# Patient Record
Sex: MALE | Race: OTHER | Employment: OTHER | URBAN - METROPOLITAN AREA
[De-identification: names, ages, dates, MRNs, and addresses within clinical notes are randomized per-mention and may not be internally consistent; named-entity substitution may affect disease eponyms.]

---

## 2017-02-09 ENCOUNTER — HOSPITAL ENCOUNTER (OUTPATIENT)
Dept: MRI IMAGING | Age: 52
Discharge: HOME | End: 2017-02-09
Attending: NEUROLOGICAL SURGERY
Payer: COMMERCIAL

## 2017-02-09 DIAGNOSIS — M54.50 LOWER BACK PAIN: ICD-10-CM

## 2017-02-09 PROCEDURE — 72148 MRI LUMBAR SPINE W/O DYE: CPT

## 2017-02-14 ENCOUNTER — OFFICE VISIT (OUTPATIENT)
Dept: INTERNAL MEDICINE CLINIC | Facility: CLINIC | Age: 52
End: 2017-02-14

## 2017-02-14 VITALS
HEIGHT: 69 IN | SYSTOLIC BLOOD PRESSURE: 145 MMHG | WEIGHT: 221 LBS | DIASTOLIC BLOOD PRESSURE: 95 MMHG | OXYGEN SATURATION: 96 % | TEMPERATURE: 98 F | HEART RATE: 87 BPM | BODY MASS INDEX: 32.73 KG/M2

## 2017-02-14 DIAGNOSIS — I10 ESSENTIAL HYPERTENSION: ICD-10-CM

## 2017-02-14 DIAGNOSIS — Z87.891 HISTORY OF SMOKING 10-25 PACK YEARS: ICD-10-CM

## 2017-02-14 DIAGNOSIS — R06.00 EXERTIONAL DYSPNEA: ICD-10-CM

## 2017-02-14 DIAGNOSIS — Z00.00 ANNUAL PHYSICAL EXAM: Primary | ICD-10-CM

## 2017-02-14 DIAGNOSIS — R42 DIZZY SPELLS: ICD-10-CM

## 2017-02-14 DIAGNOSIS — R00.2 PALPITATIONS: ICD-10-CM

## 2017-02-14 DIAGNOSIS — M54.42 CHRONIC BILATERAL LOW BACK PAIN WITH LEFT-SIDED SCIATICA: ICD-10-CM

## 2017-02-14 DIAGNOSIS — G89.29 CHRONIC BILATERAL LOW BACK PAIN WITH LEFT-SIDED SCIATICA: ICD-10-CM

## 2017-02-14 PROBLEM — R06.09 EXERTIONAL DYSPNEA: Status: ACTIVE | Noted: 2017-02-14

## 2017-02-14 LAB
ALBUMIN SERPL BCP-MCNC: 4.2 G/DL (ref 3.5–4.8)
ALBUMIN/GLOB SERPL: 1.3 {RATIO} (ref 1–2)
ALP SERPL-CCNC: 47 U/L (ref 32–100)
ALT SERPL-CCNC: 21 U/L (ref 17–63)
ANION GAP SERPL CALC-SCNC: 9 MMOL/L (ref 0–18)
AST SERPL-CCNC: 19 U/L (ref 15–41)
BASOPHILS # BLD: 0 K/UL (ref 0–0.2)
BASOPHILS NFR BLD: 1 %
BILIRUB SERPL-MCNC: 1 MG/DL (ref 0.3–1.2)
BILIRUB UR QL: NEGATIVE
BUN SERPL-MCNC: 9 MG/DL (ref 8–20)
BUN/CREAT SERPL: 12.2 (ref 10–20)
CALCIUM SERPL-MCNC: 9 MG/DL (ref 8.5–10.5)
CHLORIDE SERPL-SCNC: 104 MMOL/L (ref 95–110)
CHOLEST SERPL-MCNC: 197 MG/DL (ref 110–200)
CLARITY UR: CLEAR
CO2 SERPL-SCNC: 25 MMOL/L (ref 22–32)
COLOR UR: YELLOW
CREAT SERPL-MCNC: 0.74 MG/DL (ref 0.5–1.5)
EOSINOPHIL # BLD: 0.1 K/UL (ref 0–0.7)
EOSINOPHIL NFR BLD: 1 %
ERYTHROCYTE [DISTWIDTH] IN BLOOD BY AUTOMATED COUNT: 14 % (ref 11–15)
GLOBULIN PLAS-MCNC: 3.3 G/DL (ref 2.5–3.7)
GLUCOSE SERPL-MCNC: 91 MG/DL (ref 70–99)
GLUCOSE UR-MCNC: NEGATIVE MG/DL
HCT VFR BLD AUTO: 46.6 % (ref 41–52)
HDLC SERPL-MCNC: 37 MG/DL
HGB BLD-MCNC: 15.8 G/DL (ref 13.5–17.5)
HGB UR QL STRIP.AUTO: NEGATIVE
KETONES UR-MCNC: NEGATIVE MG/DL
LDLC SERPL CALC-MCNC: 132 MG/DL (ref 0–99)
LEUKOCYTE ESTERASE UR QL STRIP.AUTO: NEGATIVE
LYMPHOCYTES # BLD: 1.2 K/UL (ref 1–4)
LYMPHOCYTES NFR BLD: 25 %
MCH RBC QN AUTO: 31.3 PG (ref 27–32)
MCHC RBC AUTO-ENTMCNC: 34 G/DL (ref 32–37)
MCV RBC AUTO: 91.9 FL (ref 80–100)
MONOCYTES # BLD: 0.3 K/UL (ref 0–1)
MONOCYTES NFR BLD: 6 %
NEUTROPHILS # BLD AUTO: 3.2 K/UL (ref 1.8–7.7)
NEUTROPHILS NFR BLD: 67 %
NITRITE UR QL STRIP.AUTO: NEGATIVE
NONHDLC SERPL-MCNC: 160 MG/DL
OSMOLALITY UR CALC.SUM OF ELEC: 284 MOSM/KG (ref 275–295)
PH UR: 7 [PH] (ref 5–8)
PLATELET # BLD AUTO: 193 K/UL (ref 140–400)
PMV BLD AUTO: 9.8 FL (ref 7.4–10.3)
POTASSIUM SERPL-SCNC: 3.5 MMOL/L (ref 3.3–5.1)
PROT SERPL-MCNC: 7.5 G/DL (ref 5.9–8.4)
PROT UR-MCNC: NEGATIVE MG/DL
PSA SERPL-MCNC: 0.8 NG/ML (ref 0–4)
RBC # BLD AUTO: 5.07 M/UL (ref 4.5–5.9)
SODIUM SERPL-SCNC: 138 MMOL/L (ref 136–144)
SP GR UR STRIP: 1.01 (ref 1–1.03)
TRIGL SERPL-MCNC: 141 MG/DL (ref 1–149)
TSH SERPL-ACNC: 0.78 UIU/ML (ref 0.34–5.6)
UROBILINOGEN UR STRIP-ACNC: <2
VIT C UR-MCNC: NEGATIVE MG/DL
WBC # BLD AUTO: 4.8 K/UL (ref 4–11)

## 2017-02-14 PROCEDURE — 93010 ELECTROCARDIOGRAM REPORT: CPT | Performed by: INTERNAL MEDICINE

## 2017-02-14 PROCEDURE — 99386 PREV VISIT NEW AGE 40-64: CPT | Performed by: INTERNAL MEDICINE

## 2017-02-14 PROCEDURE — 93005 ELECTROCARDIOGRAM TRACING: CPT | Performed by: INTERNAL MEDICINE

## 2017-02-14 RX ORDER — AMLODIPINE BESYLATE 5 MG/1
5 TABLET ORAL DAILY
Qty: 30 TABLET | Refills: 2 | Status: SHIPPED | OUTPATIENT
Start: 2017-02-14 | End: 2017-04-03

## 2017-02-14 RX ORDER — MIRTAZAPINE 15 MG/1
TABLET, FILM COATED ORAL
COMMUNITY
Start: 2013-04-10 | End: 2017-02-14

## 2017-02-15 NOTE — PATIENT INSTRUCTIONS
ASSESSMENT/PLAN:   Annual physical exam  (primary encounter diagnosis)- exam is ok  Dizzy spells- ??, could be the high bp but with the other symptoms will need to r/o cad, and if the symptoms persist even when BP treated than might need other work up, i

## 2017-02-15 NOTE — PROGRESS NOTES
HPI:    Patient ID: Palma Pacheco is a 45year old male. HPI  Pt comes in today for annual exam but also has some complaints. Pt has been having on and off  Dizzy spells, last few minutes and go away, maybe some palpitation with it to.  Pt also lattely h Mouth/Throat: Oropharynx is clear and moist.   Eyes: Conjunctivae and EOM are normal. Pupils are equal, round, and reactive to light. Neck: Normal range of motion. Neck supple.    Cardiovascular: Normal rate, regular rhythm, normal heart sounds and [place]

## 2017-02-20 ENCOUNTER — HOSPITAL ENCOUNTER (OUTPATIENT)
Dept: NUCLEAR MEDICINE | Facility: HOSPITAL | Age: 52
Discharge: HOME | End: 2017-02-20
Attending: INTERNAL MEDICINE
Payer: COMMERCIAL

## 2017-02-20 ENCOUNTER — HOSPITAL ENCOUNTER (OUTPATIENT)
Dept: CV DIAGNOSTICS | Facility: HOSPITAL | Age: 52
Discharge: HOME | End: 2017-02-20
Attending: INTERNAL MEDICINE
Payer: COMMERCIAL

## 2017-02-20 DIAGNOSIS — R42 DIZZY SPELLS: ICD-10-CM

## 2017-02-20 DIAGNOSIS — R06.00 EXERTIONAL DYSPNEA: ICD-10-CM

## 2017-02-20 DIAGNOSIS — Z87.891 HISTORY OF SMOKING 10-25 PACK YEARS: ICD-10-CM

## 2017-02-20 DIAGNOSIS — I10 ESSENTIAL HYPERTENSION: ICD-10-CM

## 2017-02-20 PROCEDURE — 93017 CV STRESS TEST TRACING ONLY: CPT

## 2017-02-20 PROCEDURE — 78452 HT MUSCLE IMAGE SPECT MULT: CPT

## 2017-02-20 PROCEDURE — 93016 CV STRESS TEST SUPVJ ONLY: CPT | Performed by: INTERNAL MEDICINE

## 2017-02-20 PROCEDURE — 93018 CV STRESS TEST I&R ONLY: CPT | Performed by: INTERNAL MEDICINE

## 2017-02-20 PROCEDURE — 93018 CV STRESS TEST I&R ONLY: CPT | Performed by: NUCLEAR MEDICINE

## 2017-02-20 RX ORDER — 0.9 % SODIUM CHLORIDE 0.9 %
VIAL (ML) INJECTION
Status: COMPLETED
Start: 2017-02-20 | End: 2017-02-20

## 2017-02-20 RX ADMIN — 0.9 % SODIUM CHLORIDE 10 ML: 0.9 % VIAL (ML) INJECTION at 12:59:00

## 2017-03-15 ENCOUNTER — TELEPHONE (OUTPATIENT)
Dept: INTERNAL MEDICINE CLINIC | Facility: CLINIC | Age: 52
End: 2017-03-15

## 2017-03-15 RX ORDER — AMLODIPINE BESYLATE 10 MG/1
10 TABLET ORAL DAILY
Qty: 30 TABLET | Refills: 2 | Status: SHIPPED
Start: 2017-03-15 | End: 2017-04-12

## 2017-03-15 NOTE — TELEPHONE ENCOUNTER
i will increase the amlodipine to 10 mg daily, he can take 2 tabs until he finishes the once he has at home

## 2017-03-15 NOTE — TELEPHONE ENCOUNTER
Patient calling was seen last month was given medication for high blood pressure patient states BP still running on the high side this morning 144/82 please advise.

## 2017-04-03 ENCOUNTER — OFFICE VISIT (OUTPATIENT)
Dept: INTERNAL MEDICINE CLINIC | Facility: CLINIC | Age: 52
End: 2017-04-03

## 2017-04-03 ENCOUNTER — TELEPHONE (OUTPATIENT)
Dept: INTERNAL MEDICINE CLINIC | Facility: CLINIC | Age: 52
End: 2017-04-03

## 2017-04-03 VITALS
OXYGEN SATURATION: 96 % | WEIGHT: 224 LBS | HEART RATE: 109 BPM | SYSTOLIC BLOOD PRESSURE: 139 MMHG | DIASTOLIC BLOOD PRESSURE: 91 MMHG | HEIGHT: 69 IN | BODY MASS INDEX: 33.18 KG/M2

## 2017-04-03 DIAGNOSIS — I10 ESSENTIAL HYPERTENSION: Primary | ICD-10-CM

## 2017-04-03 DIAGNOSIS — R00.2 PALPITATION: ICD-10-CM

## 2017-04-03 DIAGNOSIS — R35.1 NOCTURIA: ICD-10-CM

## 2017-04-03 PROBLEM — R06.00 EXERTIONAL DYSPNEA: Status: RESOLVED | Noted: 2017-02-14 | Resolved: 2017-04-03

## 2017-04-03 PROBLEM — R42 DIZZY SPELLS: Status: RESOLVED | Noted: 2017-02-14 | Resolved: 2017-04-03

## 2017-04-03 PROBLEM — R06.09 EXERTIONAL DYSPNEA: Status: RESOLVED | Noted: 2017-02-14 | Resolved: 2017-04-03

## 2017-04-03 PROCEDURE — 99212 OFFICE O/P EST SF 10 MIN: CPT | Performed by: INTERNAL MEDICINE

## 2017-04-03 PROCEDURE — 99214 OFFICE O/P EST MOD 30 MIN: CPT | Performed by: INTERNAL MEDICINE

## 2017-04-03 PROCEDURE — 93005 ELECTROCARDIOGRAM TRACING: CPT | Performed by: INTERNAL MEDICINE

## 2017-04-03 PROCEDURE — 93010 ELECTROCARDIOGRAM REPORT: CPT | Performed by: INTERNAL MEDICINE

## 2017-04-03 RX ORDER — TERAZOSIN 5 MG/1
5 CAPSULE ORAL NIGHTLY
Qty: 30 CAPSULE | Refills: 3 | Status: SHIPPED | OUTPATIENT
Start: 2017-04-03 | End: 2017-11-11

## 2017-04-03 NOTE — TELEPHONE ENCOUNTER
Patient experiencing irregular heart beat. Patient states was exercising and started feeling irregular heart beat, checked BP was 132/86 heart rate 98.  Patient states had stress test a month ago, normal and also EKG and was normal.

## 2017-04-03 NOTE — PROGRESS NOTES
HPI:    Patient ID: Princess Lombardo is a 45year old male. HPI  Palpitations  Patient states that he has been having skipped beats off and for years. Had episode today when on treadmill and he is concerned.  He read that the norvasc can cause these symptom Normocephalic and atraumatic. Neck: Normal range of motion. No thyromegaly present. Cardiovascular: Normal rate and regular rhythm. No murmur heard. Edema not present. Carotid bruit not present.   Pulmonary/Chest: Effort normal and breath sounds nor

## 2017-04-03 NOTE — TELEPHONE ENCOUNTER
Spoke to patient. Per patient he was working out today and had an episode where he felt like his heart beat was irregular.   Patient states that he has a history of this, and recently had an EKG and Stress test that were performed, and both came back haja

## 2017-04-05 ENCOUNTER — HOSPITAL ENCOUNTER (OUTPATIENT)
Dept: CV DIAGNOSTICS | Facility: HOSPITAL | Age: 52
Discharge: HOME | End: 2017-04-05
Attending: INTERNAL MEDICINE
Payer: COMMERCIAL

## 2017-04-05 DIAGNOSIS — R00.2 PALPITATION: ICD-10-CM

## 2017-04-05 PROCEDURE — 93227 XTRNL ECG REC<48 HR R&I: CPT | Performed by: INTERNAL MEDICINE

## 2017-04-06 ENCOUNTER — HOSPITAL ENCOUNTER (OUTPATIENT)
Dept: CV DIAGNOSTICS | Facility: HOSPITAL | Age: 52
Discharge: HOME | End: 2017-04-06
Attending: INTERNAL MEDICINE
Payer: COMMERCIAL

## 2017-04-06 PROCEDURE — 93225 XTRNL ECG REC<48 HRS REC: CPT

## 2017-04-12 RX ORDER — AMLODIPINE BESYLATE 10 MG/1
TABLET ORAL
Qty: 90 TABLET | Refills: 0 | Status: SHIPPED | OUTPATIENT
Start: 2017-04-12 | End: 2017-07-09

## 2017-05-20 ENCOUNTER — HOSPITAL ENCOUNTER (OUTPATIENT)
Dept: GENERAL RADIOLOGY | Facility: HOSPITAL | Age: 52
Discharge: HOME | End: 2017-05-20
Attending: INTERNAL MEDICINE
Payer: COMMERCIAL

## 2017-05-20 ENCOUNTER — OFFICE VISIT (OUTPATIENT)
Dept: INTERNAL MEDICINE CLINIC | Facility: CLINIC | Age: 52
End: 2017-05-20

## 2017-05-20 VITALS
TEMPERATURE: 98 F | HEIGHT: 69 IN | SYSTOLIC BLOOD PRESSURE: 125 MMHG | HEART RATE: 80 BPM | WEIGHT: 223 LBS | DIASTOLIC BLOOD PRESSURE: 75 MMHG | BODY MASS INDEX: 33.03 KG/M2

## 2017-05-20 DIAGNOSIS — I10 ESSENTIAL HYPERTENSION: Primary | ICD-10-CM

## 2017-05-20 DIAGNOSIS — E78.00 HIGH CHOLESTEROL: ICD-10-CM

## 2017-05-20 DIAGNOSIS — M54.2 NECK PAIN: ICD-10-CM

## 2017-05-20 DIAGNOSIS — L98.9 SKIN LESION: ICD-10-CM

## 2017-05-20 DIAGNOSIS — Z87.891 HISTORY OF SMOKING 10-25 PACK YEARS: ICD-10-CM

## 2017-05-20 PROCEDURE — 99212 OFFICE O/P EST SF 10 MIN: CPT | Performed by: INTERNAL MEDICINE

## 2017-05-20 PROCEDURE — 99214 OFFICE O/P EST MOD 30 MIN: CPT | Performed by: INTERNAL MEDICINE

## 2017-05-20 PROCEDURE — 72050 X-RAY EXAM NECK SPINE 4/5VWS: CPT | Performed by: INTERNAL MEDICINE

## 2017-05-20 RX ORDER — LORATADINE 10 MG/1
10 TABLET ORAL DAILY
Qty: 30 TABLET | Refills: 6 | Status: SHIPPED | OUTPATIENT
Start: 2017-05-20 | End: 2019-04-27

## 2017-05-20 NOTE — PROGRESS NOTES
HPI:    Patient ID: Esteban Perez is a 47year old male. HPI   Hypertension  Patient is here for follow up of hypertension. BP at home: not checking, has been good Has been compliant with medications.   Exercise level: less active, has been working more many years, occasional 'headers'. Review of Systems   HENT: Positive for rhinorrhea (clear with weather changes). Negative for congestion. Feels dryness to his nose/post pharynx, ears bilat.  Has occ sneezing     Skin:        Skin  Lesion left a evaluation  Neck pain  Patient with numbness to his shoulders after sleeping, may have arthritis in his neck, recommend that he check x-ray let me know if bothersome.   Dryness  Likely allergies, recommend he try claritin daily    ID#[id number]

## 2017-05-20 NOTE — PATIENT INSTRUCTIONS
ASSESSMENT/PLAN:   Diagnoses and all orders for this visit:    Essential hypertension  BP overall doing well   High cholesterol  Patient with mildly high cholesterol, recommend diet at least 6 months before rechec  History of smoking 10-25 pack years  Need

## 2017-05-22 ENCOUNTER — TELEPHONE (OUTPATIENT)
Dept: INTERNAL MEDICINE CLINIC | Facility: CLINIC | Age: 52
End: 2017-05-22

## 2017-05-22 NOTE — TELEPHONE ENCOUNTER
Patient called office back. Verified . Informed him of xray results and Dr's recommendation for MRI of neck. Patient stated he's still experiencing symptoms of numbness. MRI currently being authorized by managed care.  Patient verbalized understanding to

## 2017-05-22 NOTE — TELEPHONE ENCOUNTER
Notes Recorded by Sven Marrero RN on 5/22/2017 at 3:04 PM  Call went straight to voice mail. Left message to call office back.     Notes Recorded by Elis Stewart MD on 5/21/2017 at 11:39 AM  X-ray shows significant arthritis and severe narr

## 2017-05-28 ENCOUNTER — HOSPITAL ENCOUNTER (OUTPATIENT)
Dept: MRI IMAGING | Facility: HOSPITAL | Age: 52
Discharge: HOME | End: 2017-05-28
Attending: INTERNAL MEDICINE
Payer: COMMERCIAL

## 2017-05-28 DIAGNOSIS — M54.12 CERVICAL RADICULOPATHY AT C7: ICD-10-CM

## 2017-05-28 DIAGNOSIS — R93.89 ABNORMAL X-RAY OF NECK: ICD-10-CM

## 2017-05-28 PROCEDURE — 72141 MRI NECK SPINE W/O DYE: CPT | Performed by: INTERNAL MEDICINE

## 2017-07-10 RX ORDER — AMLODIPINE BESYLATE 10 MG/1
TABLET ORAL
Qty: 90 TABLET | Refills: 0 | Status: SHIPPED | OUTPATIENT
Start: 2017-07-10 | End: 2017-10-06

## 2017-10-06 RX ORDER — AMLODIPINE BESYLATE 10 MG/1
TABLET ORAL
Qty: 90 TABLET | Refills: 0 | Status: SHIPPED | OUTPATIENT
Start: 2017-10-06 | End: 2018-01-02

## 2017-10-06 NOTE — TELEPHONE ENCOUNTER
Hypertensive Medications  Protocol Criteria:  · Appointment scheduled in the past 6 months or in the next 3 months  · BMP or CMP in the past 12 months  · Creatinine result < 2  Recent Outpatient Visits            4 months ago Essential hypertension    [facility]

## 2017-11-11 ENCOUNTER — OFFICE VISIT (OUTPATIENT)
Dept: INTERNAL MEDICINE CLINIC | Facility: CLINIC | Age: 52
End: 2017-11-11

## 2017-11-11 VITALS
BODY MASS INDEX: 31.16 KG/M2 | HEIGHT: 69 IN | HEART RATE: 75 BPM | TEMPERATURE: 98 F | OXYGEN SATURATION: 97 % | DIASTOLIC BLOOD PRESSURE: 70 MMHG | SYSTOLIC BLOOD PRESSURE: 130 MMHG | WEIGHT: 210.38 LBS

## 2017-11-11 DIAGNOSIS — R42 DIZZINESS: ICD-10-CM

## 2017-11-11 DIAGNOSIS — Z87.891 HISTORY OF SMOKING 10-25 PACK YEARS: ICD-10-CM

## 2017-11-11 DIAGNOSIS — E78.00 HIGH CHOLESTEROL: ICD-10-CM

## 2017-11-11 DIAGNOSIS — Z11.59 NEED FOR HEPATITIS C SCREENING TEST: ICD-10-CM

## 2017-11-11 DIAGNOSIS — I10 ESSENTIAL HYPERTENSION: Primary | ICD-10-CM

## 2017-11-11 DIAGNOSIS — R05.9 COUGH: ICD-10-CM

## 2017-11-11 PROCEDURE — 90686 IIV4 VACC NO PRSV 0.5 ML IM: CPT | Performed by: INTERNAL MEDICINE

## 2017-11-11 PROCEDURE — 90471 IMMUNIZATION ADMIN: CPT | Performed by: INTERNAL MEDICINE

## 2017-11-11 PROCEDURE — 99214 OFFICE O/P EST MOD 30 MIN: CPT | Performed by: INTERNAL MEDICINE

## 2017-11-11 PROCEDURE — 99212 OFFICE O/P EST SF 10 MIN: CPT | Performed by: INTERNAL MEDICINE

## 2017-11-11 PROCEDURE — 36415 COLL VENOUS BLD VENIPUNCTURE: CPT | Performed by: INTERNAL MEDICINE

## 2017-11-11 NOTE — PROGRESS NOTES
HPI:    Patient ID: Jose Alejandro Early is a 47year old male. Cough   This is a new problem. Episode onset: 1 month. The problem has been unchanged. Episode frequency: intemittently. The cough is productive of sputum (brown or clear).  Associated symptoms inc 02/14/2017 11:42 AM    (H) 02/14/2017 11:42 AM   NONHDLC 160 (H) 02/14/2017 11:42 AM           Review of Systems   Constitutional: Negative for fatigue and fever. HENT: Negative for hearing loss.     Respiratory: Positive for cough, shortness of br present. Pulmonary/Chest: Effort normal and breath sounds normal.   Abdominal: Soft. Bowel sounds are normal. There is no tenderness.            ASSESSMENT/PLAN:   Diagnoses and all orders for this visit:    Essential hypertension  BP overall doing very we

## 2017-11-11 NOTE — PATIENT INSTRUCTIONS
ASSESSMENT/PLAN:   Diagnoses and all orders for this visit:    Essential hypertension  BP overall doing very well, continue current meds and regular exercise.     High cholesterol  Patient's cholesterol controlled, will check next year  Cough  Patient with

## 2017-11-13 NOTE — PROGRESS NOTES
Labs normal except the B12 is a little on the low side. Recommend he take oral supplement 400-500 mcg daily.

## 2017-11-29 ENCOUNTER — HOSPITAL ENCOUNTER (OUTPATIENT)
Dept: GENERAL RADIOLOGY | Facility: HOSPITAL | Age: 52
Discharge: HOME | End: 2017-11-29
Attending: INTERNAL MEDICINE
Payer: COMMERCIAL

## 2017-11-29 DIAGNOSIS — R05.9 COUGH: ICD-10-CM

## 2017-11-29 PROCEDURE — 71020 XR CHEST PA + LAT CHEST (CPT=71020): CPT | Performed by: INTERNAL MEDICINE

## 2018-01-02 RX ORDER — AMLODIPINE BESYLATE 10 MG/1
TABLET ORAL
Qty: 90 TABLET | Refills: 0 | Status: SHIPPED | OUTPATIENT
Start: 2018-01-02 | End: 2018-04-23

## 2018-04-24 RX ORDER — AMLODIPINE BESYLATE 10 MG/1
TABLET ORAL
Qty: 90 TABLET | Refills: 0 | Status: SHIPPED | OUTPATIENT
Start: 2018-04-24 | End: 2018-07-20

## 2018-05-30 ENCOUNTER — OFFICE VISIT (OUTPATIENT)
Dept: INTERNAL MEDICINE CLINIC | Facility: CLINIC | Age: 53
End: 2018-05-30

## 2018-05-30 VITALS
SYSTOLIC BLOOD PRESSURE: 120 MMHG | BODY MASS INDEX: 31.55 KG/M2 | OXYGEN SATURATION: 98 % | TEMPERATURE: 99 F | HEART RATE: 87 BPM | DIASTOLIC BLOOD PRESSURE: 70 MMHG | HEIGHT: 69 IN | WEIGHT: 213 LBS

## 2018-05-30 DIAGNOSIS — R10.11 ABDOMINAL PAIN, RUQ: ICD-10-CM

## 2018-05-30 DIAGNOSIS — I10 ESSENTIAL HYPERTENSION: Primary | ICD-10-CM

## 2018-05-30 PROCEDURE — 99212 OFFICE O/P EST SF 10 MIN: CPT | Performed by: INTERNAL MEDICINE

## 2018-05-30 PROCEDURE — 36415 COLL VENOUS BLD VENIPUNCTURE: CPT | Performed by: INTERNAL MEDICINE

## 2018-05-30 PROCEDURE — 99213 OFFICE O/P EST LOW 20 MIN: CPT | Performed by: INTERNAL MEDICINE

## 2018-05-30 RX ORDER — DICLOFENAC SODIUM 75 MG/1
TABLET, DELAYED RELEASE ORAL
Qty: 180 TABLET | Refills: 1 | Status: SHIPPED | OUTPATIENT
Start: 2018-05-30 | End: 2020-02-01

## 2018-05-30 RX ORDER — DICLOFENAC SODIUM 75 MG/1
75 TABLET, DELAYED RELEASE ORAL 2 TIMES DAILY
Qty: 60 TABLET | Refills: 3 | Status: SHIPPED | OUTPATIENT
Start: 2018-05-30 | End: 2018-05-30

## 2018-05-30 NOTE — ASSESSMENT & PLAN NOTE
Patient with abd pain, seems to have started after moving his parents, may be muscular. Will check labs and then do US of the gallbladder.  Will also give antiinflammatory for the pain

## 2018-05-30 NOTE — PROGRESS NOTES
HPI:    Patient ID: Tara Montanez is a 46year old male. HPI  Back/RUQ pain  Patient was helping his parents move and developed Right back and RUQ pain. Patient taking advil which seemed to help, then stopped, concerned if safe to take with the BP meds. Allergies  PHYSICAL EXAM:    Physical Exam   Vitals reviewed. Constitutional: He appears well-developed and well-nourished. HENT:   Head: Normocephalic and atraumatic. Cardiovascular: Normal rate and regular rhythm. Edema not present.   Pulmonary/

## 2018-05-30 NOTE — PATIENT INSTRUCTIONS
ASSESSMENT/PLAN:   Essential hypertension  BP overall doing very well, continue current meds and regular exercise. Abdominal pain, RUQ  Patient with abd pain, seems to have started after moving his parents, may be muscular.  Will check labs and the

## 2018-06-02 ENCOUNTER — HOSPITAL ENCOUNTER (OUTPATIENT)
Dept: ULTRASOUND IMAGING | Age: 53
Discharge: HOME | End: 2018-06-02
Attending: INTERNAL MEDICINE
Payer: COMMERCIAL

## 2018-06-02 DIAGNOSIS — R10.11 ABDOMINAL PAIN, RUQ: ICD-10-CM

## 2018-06-02 PROCEDURE — 76705 ECHO EXAM OF ABDOMEN: CPT | Performed by: INTERNAL MEDICINE

## 2018-07-20 RX ORDER — AMLODIPINE BESYLATE 10 MG/1
TABLET ORAL
Qty: 90 TABLET | Refills: 0 | Status: SHIPPED | OUTPATIENT
Start: 2018-07-20 | End: 2018-10-14

## 2018-10-15 RX ORDER — AMLODIPINE BESYLATE 10 MG/1
TABLET ORAL
Qty: 90 TABLET | Refills: 0 | Status: SHIPPED | OUTPATIENT
Start: 2018-10-15 | End: 2019-01-09

## 2019-01-09 RX ORDER — AMLODIPINE BESYLATE 10 MG/1
TABLET ORAL
Qty: 90 TABLET | Refills: 0 | Status: SHIPPED | OUTPATIENT
Start: 2019-01-09 | End: 2019-04-20

## 2019-02-02 ENCOUNTER — APPOINTMENT (OUTPATIENT)
Dept: GENERAL RADIOLOGY | Facility: HOSPITAL | Age: 54
End: 2019-02-02
Attending: EMERGENCY MEDICINE
Payer: COMMERCIAL

## 2019-02-02 ENCOUNTER — TELEPHONE (OUTPATIENT)
Dept: INTERNAL MEDICINE CLINIC | Facility: CLINIC | Age: 54
End: 2019-02-02

## 2019-02-02 ENCOUNTER — HOSPITAL ENCOUNTER (EMERGENCY)
Facility: HOSPITAL | Age: 54
Discharge: HOME | End: 2019-02-02
Attending: EMERGENCY MEDICINE
Payer: COMMERCIAL

## 2019-02-02 VITALS
HEART RATE: 75 BPM | HEIGHT: 70 IN | WEIGHT: 205 LBS | TEMPERATURE: 98 F | BODY MASS INDEX: 29.35 KG/M2 | SYSTOLIC BLOOD PRESSURE: 134 MMHG | RESPIRATION RATE: 18 BRPM | DIASTOLIC BLOOD PRESSURE: 80 MMHG | OXYGEN SATURATION: 98 %

## 2019-02-02 DIAGNOSIS — R07.89 CHEST PAIN, ATYPICAL: Primary | ICD-10-CM

## 2019-02-02 LAB
ANION GAP SERPL CALC-SCNC: 11 MMOL/L (ref 0–18)
BASOPHILS # BLD AUTO: 0.03 X10(3) UL (ref 0–0.2)
BASOPHILS NFR BLD AUTO: 0.5 %
BUN SERPL-MCNC: 12 MG/DL (ref 8–20)
BUN/CREAT SERPL: 11.4 (ref 10–20)
CALCIUM SERPL-MCNC: 8.9 MG/DL (ref 8.5–10.5)
CHLORIDE SERPL-SCNC: 100 MMOL/L (ref 95–110)
CO2 SERPL-SCNC: 26 MMOL/L (ref 22–32)
CREAT SERPL-MCNC: 1.05 MG/DL (ref 0.5–1.5)
DEPRECATED RDW RBC AUTO: 45.4 FL (ref 35.1–46.3)
EOSINOPHIL # BLD AUTO: 0.12 X10(3) UL (ref 0–0.7)
EOSINOPHIL NFR BLD AUTO: 2 %
ERYTHROCYTE [DISTWIDTH] IN BLOOD BY AUTOMATED COUNT: 13.2 % (ref 11–15)
GLUCOSE SERPL-MCNC: 112 MG/DL (ref 70–99)
HCT VFR BLD AUTO: 47.8 % (ref 39–53)
HGB BLD-MCNC: 16.3 G/DL (ref 13–17.5)
IMM GRANULOCYTES # BLD AUTO: 0.02 X10(3) UL (ref 0–1)
IMM GRANULOCYTES NFR BLD: 0.3 %
LYMPHOCYTES # BLD AUTO: 1.42 X10(3) UL (ref 1–4)
LYMPHOCYTES NFR BLD AUTO: 23.4 %
MCH RBC QN AUTO: 31.8 PG (ref 26–34)
MCHC RBC AUTO-ENTMCNC: 34.1 G/DL (ref 31–37)
MCV RBC AUTO: 93.2 FL (ref 80–100)
MONOCYTES # BLD AUTO: 0.4 X10(3) UL (ref 0.1–1)
MONOCYTES NFR BLD AUTO: 6.6 %
NEUTROPHILS # BLD AUTO: 4.09 X10 (3) UL (ref 1.5–7.7)
NEUTROPHILS # BLD AUTO: 4.09 X10(3) UL (ref 1.5–7.7)
NEUTROPHILS NFR BLD AUTO: 67.2 %
OSMOLALITY UR CALC.SUM OF ELEC: 285 MOSM/KG (ref 275–295)
PLATELET # BLD AUTO: 232 10(3)UL (ref 150–450)
POTASSIUM SERPL-SCNC: 4.2 MMOL/L (ref 3.3–5.1)
RBC # BLD AUTO: 5.13 X10(6)UL (ref 4.3–5.7)
SODIUM SERPL-SCNC: 137 MMOL/L (ref 136–144)
TROPONIN I SERPL-MCNC: 0.01 NG/ML (ref ?–0.03)
WBC # BLD AUTO: 6.1 X10(3) UL (ref 4–11)

## 2019-02-02 PROCEDURE — 84484 ASSAY OF TROPONIN QUANT: CPT | Performed by: EMERGENCY MEDICINE

## 2019-02-02 PROCEDURE — 80048 BASIC METABOLIC PNL TOTAL CA: CPT | Performed by: EMERGENCY MEDICINE

## 2019-02-02 PROCEDURE — 36415 COLL VENOUS BLD VENIPUNCTURE: CPT

## 2019-02-02 PROCEDURE — 93010 ELECTROCARDIOGRAM REPORT: CPT | Performed by: EMERGENCY MEDICINE

## 2019-02-02 PROCEDURE — 93005 ELECTROCARDIOGRAM TRACING: CPT

## 2019-02-02 PROCEDURE — 99285 EMERGENCY DEPT VISIT HI MDM: CPT

## 2019-02-02 PROCEDURE — 85025 COMPLETE CBC W/AUTO DIFF WBC: CPT | Performed by: EMERGENCY MEDICINE

## 2019-02-02 PROCEDURE — 71046 X-RAY EXAM CHEST 2 VIEWS: CPT | Performed by: EMERGENCY MEDICINE

## 2019-02-02 NOTE — ED PROVIDER NOTES
Patient Seen in: [facility] AND [facility] Emergency Department    History   Patient presents with:  Chest Pain Angina (cardiovascular)    Stated Complaint:     HPI    68-year-old male with past medical history significant for high blood pressure presents to the b/l with no rales, wheezes. No chest wall tenderness  Abdominal: Nontender. Nondistended. Soft. Bowel sounds are normal.   Back:   : Musculoskeletal: Normal range of motion. No deformity.    Lymphadenopathy: No sig cervical LAD   Neurological: Awake, with discharge at this time.         Disposition and Plan     Clinical Impression:  Chest pain, atypical  (primary encounter diagnosis)    Disposition:  Discharge  2/2/2019 12:47 pm    Follow-up:  Kevin Flores MD  [address]  [facility]

## 2019-02-02 NOTE — ED INITIAL ASSESSMENT (HPI)
C/o midsternal chest pain that started about 40 minutes ago, states now resolved. Denies SOB. Denies recent travel.

## 2019-02-02 NOTE — TELEPHONE ENCOUNTER
Patient walked into clinic experiencing chest pains. He was accompanied by his sister, Edward Martinez. Brought patient back. BP:  180/84    Patient states he has been experiencing chest pains for more than an hour.   Sunny Mendoza he has had heartburn before but this f

## 2019-02-02 NOTE — TELEPHONE ENCOUNTER
Just walked into clinic. Looks comfortable. Refused ambulance. Having CP. Referred to ER. Sister states she will take him.

## 2019-03-15 ENCOUNTER — HOSPITAL ENCOUNTER (OUTPATIENT)
Dept: MRI IMAGING | Facility: HOSPITAL | Age: 54
Discharge: HOME | End: 2019-03-15
Attending: NEUROLOGICAL SURGERY
Payer: COMMERCIAL

## 2019-03-15 ENCOUNTER — APPOINTMENT (OUTPATIENT)
Dept: MRI IMAGING | Facility: HOSPITAL | Age: 54
End: 2019-03-15
Attending: NEUROLOGICAL SURGERY
Payer: COMMERCIAL

## 2019-03-15 DIAGNOSIS — M47.22 CERVICAL SPONDYLOSIS WITH RADICULOPATHY: ICD-10-CM

## 2019-03-15 DIAGNOSIS — S29.012A UPPER BACK STRAIN: ICD-10-CM

## 2019-03-15 DIAGNOSIS — M50.30 DEGENERATIVE DISC DISEASE, CERVICAL: ICD-10-CM

## 2019-03-15 PROCEDURE — 72148 MRI LUMBAR SPINE W/O DYE: CPT | Performed by: NEUROLOGICAL SURGERY

## 2019-03-15 PROCEDURE — 72141 MRI NECK SPINE W/O DYE: CPT | Performed by: NEUROLOGICAL SURGERY

## 2019-03-15 PROCEDURE — 72146 MRI CHEST SPINE W/O DYE: CPT | Performed by: NEUROLOGICAL SURGERY

## 2019-04-22 ENCOUNTER — TELEPHONE (OUTPATIENT)
Dept: INTERNAL MEDICINE CLINIC | Facility: CLINIC | Age: 54
End: 2019-04-22

## 2019-04-22 RX ORDER — AMLODIPINE BESYLATE 10 MG/1
TABLET ORAL
Qty: 90 TABLET | Refills: 0 | Status: SHIPPED | OUTPATIENT
Start: 2019-04-22 | End: 2019-07-18

## 2019-04-22 NOTE — TELEPHONE ENCOUNTER
Protocol failed for no appointment only. One refill granted.     Requested Prescriptions     Pending Prescriptions Disp Refills   • AMLODIPINE BESYLATE 10 MG Oral Tab [Pharmacy Med Name: AMLODIPINE BESYLATE 10MG TABLETS] 90 tablet 0     Sig: TAKE 1 TABLET B

## 2019-04-22 NOTE — TELEPHONE ENCOUNTER
Left message with patient to call office to schedule appointment for future prescription refills. One-time refill granted.

## 2019-04-22 NOTE — TELEPHONE ENCOUNTER
Patient needs an appointment for future refills of this medication. One-time refill granted 4/22/19.

## 2019-04-27 ENCOUNTER — OFFICE VISIT (OUTPATIENT)
Dept: INTERNAL MEDICINE CLINIC | Facility: CLINIC | Age: 54
End: 2019-04-27
Payer: COMMERCIAL

## 2019-04-27 VITALS
OXYGEN SATURATION: 98 % | TEMPERATURE: 99 F | HEART RATE: 89 BPM | BODY MASS INDEX: 30.66 KG/M2 | DIASTOLIC BLOOD PRESSURE: 80 MMHG | WEIGHT: 207 LBS | SYSTOLIC BLOOD PRESSURE: 130 MMHG | HEIGHT: 69 IN

## 2019-04-27 DIAGNOSIS — M54.50 ACUTE BILATERAL LOW BACK PAIN WITHOUT SCIATICA: ICD-10-CM

## 2019-04-27 DIAGNOSIS — M54.2 NECK PAIN, ACUTE: ICD-10-CM

## 2019-04-27 DIAGNOSIS — Z00.00 ANNUAL PHYSICAL EXAM: Primary | ICD-10-CM
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
## 2019-04-27 DIAGNOSIS — Z71.6 ENCOUNTER FOR SMOKING CESSATION COUNSELING: ICD-10-CM

## 2019-04-27 DIAGNOSIS — Z00.00 ENCOUNTER FOR PREVENTIVE CARE: ICD-10-CM

## 2019-04-27 DIAGNOSIS — M54.6 ACUTE BILATERAL THORACIC BACK PAIN: ICD-10-CM

## 2019-04-27 DIAGNOSIS — I10 ESSENTIAL HYPERTENSION: ICD-10-CM

## 2019-04-27 PROCEDURE — 99396 PREV VISIT EST AGE 40-64: CPT | Performed by: INTERNAL MEDICINE

## 2019-04-27 RX ORDER — METHYLPREDNISOLONE 4 MG/1
TABLET ORAL
Qty: 1 KIT | Refills: 0 | Status: SHIPPED | OUTPATIENT
Start: 2019-04-27 | End: 2019-09-04
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
## 2019-05-10 ENCOUNTER — APPOINTMENT (OUTPATIENT)
Dept: LAB | Age: 54
End: 2019-05-10
Attending: INTERNAL MEDICINE
Payer: COMMERCIAL

## 2019-05-10 DIAGNOSIS — R31.9 HEMATURIA, UNSPECIFIED TYPE: ICD-10-CM

## 2019-05-10 PROCEDURE — 81003 URINALYSIS AUTO W/O SCOPE: CPT

## 2019-07-18 RX ORDER — AMLODIPINE BESYLATE 10 MG/1
TABLET ORAL
Qty: 90 TABLET | Refills: 1 | Status: SHIPPED | OUTPATIENT
Start: 2019-07-18 | End: 2020-01-21

## 2019-07-18 NOTE — TELEPHONE ENCOUNTER
Refill passed per [facility] protocol.   Hypertensive Medications  Protocol Criteria:  · Appointment scheduled in the past 6 months or in the next 3 months  · BMP or CMP in the past 12 months  · Creatinine result < 2  Recent Outpatient Visits

## 2019-08-28 ENCOUNTER — APPOINTMENT (OUTPATIENT)
Dept: MRI IMAGING | Facility: HOSPITAL | Age: 54
End: 2019-08-28
Attending: EMERGENCY MEDICINE
Payer: COMMERCIAL

## 2019-08-28 ENCOUNTER — HOSPITAL ENCOUNTER (EMERGENCY)
Facility: HOSPITAL | Age: 54
Discharge: HOME | End: 2019-08-28
Attending: EMERGENCY MEDICINE
Payer: COMMERCIAL

## 2019-08-28 VITALS
BODY MASS INDEX: 28.63 KG/M2 | DIASTOLIC BLOOD PRESSURE: 82 MMHG | TEMPERATURE: 98 F | OXYGEN SATURATION: 95 % | WEIGHT: 200 LBS | RESPIRATION RATE: 18 BRPM | SYSTOLIC BLOOD PRESSURE: 129 MMHG | HEIGHT: 70 IN | HEART RATE: 74 BPM

## 2019-08-28 DIAGNOSIS — R20.2 FACIAL PARESTHESIA: Primary | ICD-10-CM

## 2019-08-28 LAB
ANION GAP SERPL CALC-SCNC: 6 MMOL/L (ref 0–18)
BASOPHILS # BLD AUTO: 0.04 X10(3) UL (ref 0–0.2)
BASOPHILS NFR BLD AUTO: 0.6 %
BUN BLD-MCNC: 10 MG/DL (ref 7–18)
BUN/CREAT SERPL: 11.6 (ref 10–20)
CALCIUM BLD-MCNC: 8.8 MG/DL (ref 8.5–10.1)
CHLORIDE SERPL-SCNC: 106 MMOL/L (ref 98–112)
CO2 SERPL-SCNC: 27 MMOL/L (ref 21–32)
CREAT BLD-MCNC: 0.86 MG/DL (ref 0.7–1.3)
DEPRECATED RDW RBC AUTO: 44.2 FL (ref 35.1–46.3)
EOSINOPHIL # BLD AUTO: 0.09 X10(3) UL (ref 0–0.7)
EOSINOPHIL NFR BLD AUTO: 1.5 %
ERYTHROCYTE [DISTWIDTH] IN BLOOD BY AUTOMATED COUNT: 13 % (ref 11–15)
GLUCOSE BLD-MCNC: 130 MG/DL (ref 70–99)
HCT VFR BLD AUTO: 46.9 % (ref 39–53)
HGB BLD-MCNC: 16.4 G/DL (ref 13–17.5)
IMM GRANULOCYTES # BLD AUTO: 0.02 X10(3) UL (ref 0–1)
IMM GRANULOCYTES NFR BLD: 0.3 %
LYMPHOCYTES # BLD AUTO: 1.01 X10(3) UL (ref 1–4)
LYMPHOCYTES NFR BLD AUTO: 16.3 %
MCH RBC QN AUTO: 32.3 PG (ref 26–34)
MCHC RBC AUTO-ENTMCNC: 35 G/DL (ref 31–37)
MCV RBC AUTO: 92.3 FL (ref 80–100)
MONOCYTES # BLD AUTO: 0.46 X10(3) UL (ref 0.1–1)
MONOCYTES NFR BLD AUTO: 7.4 %
NEUTROPHILS # BLD AUTO: 4.57 X10 (3) UL (ref 1.5–7.7)
NEUTROPHILS # BLD AUTO: 4.57 X10(3) UL (ref 1.5–7.7)
NEUTROPHILS NFR BLD AUTO: 73.9 %
OSMOLALITY SERPL CALC.SUM OF ELEC: 289 MOSM/KG (ref 275–295)
PLATELET # BLD AUTO: 212 10(3)UL (ref 150–450)
POTASSIUM SERPL-SCNC: 3.5 MMOL/L (ref 3.5–5.1)
RBC # BLD AUTO: 5.08 X10(6)UL (ref 4.3–5.7)
SODIUM SERPL-SCNC: 139 MMOL/L (ref 136–145)
WBC # BLD AUTO: 6.2 X10(3) UL (ref 4–11)

## 2019-08-28 PROCEDURE — 36415 COLL VENOUS BLD VENIPUNCTURE: CPT

## 2019-08-28 PROCEDURE — 99285 EMERGENCY DEPT VISIT HI MDM: CPT

## 2019-08-28 PROCEDURE — 85025 COMPLETE CBC W/AUTO DIFF WBC: CPT | Performed by: EMERGENCY MEDICINE

## 2019-08-28 PROCEDURE — 70551 MRI BRAIN STEM W/O DYE: CPT | Performed by: EMERGENCY MEDICINE

## 2019-08-28 PROCEDURE — 80048 BASIC METABOLIC PNL TOTAL CA: CPT | Performed by: EMERGENCY MEDICINE

## 2019-08-28 NOTE — ED PROVIDER NOTES
Patient Seen in: [facility] AND [facility] Emergency Department    History   Patient presents with:  Numbness    Stated Complaint: left facial numbness since monday    HPI    The patient is a 14-year-old male who presents with numbness to his lower face and late Mouth/Throat: Oropharynx is clear and moist.   No temporal tenderness   Eyes: Pupils are equal, round, and reactive to light. Conjunctivae and EOM are normal. Right eye exhibits no nystagmus. Left eye exhibits no nystagmus.    Neck: Normal range of motion stable neuro exam stable in the emergency department. Patient be given follow-up with neurology and instructed to take aspirin daily until he sees them.   Patient comfortable with discharge plan    MDM   Pulse Ox: 95%, Normal, room air    Cardiac Monitor:

## 2019-08-30 ENCOUNTER — MED REC SCAN ONLY (OUTPATIENT)
Dept: INTERNAL MEDICINE CLINIC | Facility: CLINIC | Age: 54
End: 2019-08-30

## 2019-09-04 ENCOUNTER — OFFICE VISIT (OUTPATIENT)
Dept: NEUROLOGY | Facility: CLINIC | Age: 54
End: 2019-09-04
Payer: COMMERCIAL

## 2019-09-04 ENCOUNTER — APPOINTMENT (OUTPATIENT)
Dept: LAB | Facility: HOSPITAL | Age: 54
End: 2019-09-04
Attending: Other
Payer: COMMERCIAL

## 2019-09-04 VITALS
SYSTOLIC BLOOD PRESSURE: 160 MMHG | WEIGHT: 200 LBS | RESPIRATION RATE: 16 BRPM | HEIGHT: 69 IN | HEART RATE: 80 BPM | DIASTOLIC BLOOD PRESSURE: 80 MMHG | BODY MASS INDEX: 29.62 KG/M2

## 2019-09-04 DIAGNOSIS — G50.9 TRIGEMINAL NEUROPATHY: Primary | ICD-10-CM

## 2019-09-04 DIAGNOSIS — G50.9 TRIGEMINAL NEUROPATHY: ICD-10-CM

## 2019-09-04 LAB — ERYTHROCYTE [SEDIMENTATION RATE] IN BLOOD: 4 MM/HR (ref 0–20)

## 2019-09-04 PROCEDURE — 85652 RBC SED RATE AUTOMATED: CPT

## 2019-09-04 PROCEDURE — 36415 COLL VENOUS BLD VENIPUNCTURE: CPT

## 2019-09-04 PROCEDURE — 1111F DSCHRG MED/CURRENT MED MERGE: CPT | Performed by: OTHER

## 2019-09-04 PROCEDURE — 99244 OFF/OP CNSLTJ NEW/EST MOD 40: CPT | Performed by: OTHER

## 2019-09-04 NOTE — PATIENT INSTRUCTIONS
Trigeminal neuralgia  Overview  Trigeminal neuralgia is a chronic pain condition that affects the trigeminal nerve, which carries sensation from your face to your brain.  If you have trigeminal neuralgia, even mild stimulation of your face — such as from doctor. Causes  In trigeminal neuralgia, also called tic douloureux, the trigeminal nerve's function is disrupted.  Usually, the problem is contact between a normal blood vessel — in this case, an artery or a vein — and the trigeminal nerve at the base of

## 2019-09-05 NOTE — PROGRESS NOTES
Neurology Outpatient Consult Note    Flaco Fair : 1965   Referring Physician: Dr. Jhon Hogan  HPI:     Flaco Fair is a 53year old male who is being seen in neurologic evaluation. Patient being seen in evaluation for facial paresthesias. Current Every Day Smoker        Packs/day: 0.50        Years: 30.00        Pack years: 15      Smokeless tobacco: Never Used    Substance and Sexual Activity      Alcohol use: No        Alcohol/week: 0.0 standard drinks      Drug use: No        ROS:   GENE of chronic microvascular ischemic disease, particularly if the patient has a long-standing history of diabetes and/or hypertension.  Less likely differential   diagnostic considerations include sequela of vascular headache, demyelinating disease, vasculitis in lay terms and addressed his questions and concerns    –We did discuss MRI brain imaging; doubtful that white matter changes represent progressive neurologic illness such as demyelinating disease, more likely sequela of microvascular ischemic disease

## 2020-01-21 RX ORDER — AMLODIPINE BESYLATE 10 MG/1
TABLET ORAL
Qty: 90 TABLET | Refills: 0 | OUTPATIENT
Start: 2020-01-21

## 2020-01-21 RX ORDER — AMLODIPINE BESYLATE 10 MG/1
10 TABLET ORAL
Qty: 30 TABLET | Refills: 0 | Status: SHIPPED | OUTPATIENT
Start: 2020-01-21 | End: 2020-02-19

## 2020-01-22 RX ORDER — AMLODIPINE BESYLATE 10 MG/1
TABLET ORAL
Qty: 90 TABLET | Refills: 0 | OUTPATIENT
Start: 2020-01-22

## 2020-02-01 ENCOUNTER — OFFICE VISIT (OUTPATIENT)
Dept: INTERNAL MEDICINE CLINIC | Facility: CLINIC | Age: 55
End: 2020-02-01
Payer: COMMERCIAL

## 2020-02-01 VITALS
SYSTOLIC BLOOD PRESSURE: 130 MMHG | HEIGHT: 67.75 IN | WEIGHT: 212.81 LBS | HEART RATE: 72 BPM | BODY MASS INDEX: 32.63 KG/M2 | TEMPERATURE: 98 F | DIASTOLIC BLOOD PRESSURE: 76 MMHG | OXYGEN SATURATION: 97 %

## 2020-02-01 DIAGNOSIS — E78.00 HIGH CHOLESTEROL: ICD-10-CM

## 2020-02-01 DIAGNOSIS — G89.29 CHRONIC NECK PAIN: ICD-10-CM

## 2020-02-01 DIAGNOSIS — I10 ESSENTIAL HYPERTENSION: Primary | ICD-10-CM

## 2020-02-01 DIAGNOSIS — M47.812 OSTEOARTHRITIS OF CERVICAL SPINE, UNSPECIFIED SPINAL OSTEOARTHRITIS COMPLICATION STATUS: ICD-10-CM

## 2020-02-01 DIAGNOSIS — M54.2 CHRONIC NECK PAIN: ICD-10-CM

## 2020-02-01 PROCEDURE — 99214 OFFICE O/P EST MOD 30 MIN: CPT | Performed by: INTERNAL MEDICINE

## 2020-02-01 RX ORDER — DICLOFENAC SODIUM 75 MG/1
75 TABLET, DELAYED RELEASE ORAL 2 TIMES DAILY
Qty: 180 TABLET | Refills: 1 | Status: SHIPPED | OUTPATIENT
Start: 2020-02-01 | End: 2021-09-11

## 2020-02-01 RX ORDER — CYCLOBENZAPRINE HCL 5 MG
5 TABLET ORAL 2 TIMES DAILY PRN
Qty: 30 TABLET | Refills: 0 | Status: SHIPPED | OUTPATIENT
Start: 2020-02-01 | End: 2020-05-16
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
## 2020-02-19 RX ORDER — AMLODIPINE BESYLATE 10 MG/1
TABLET ORAL
Qty: 90 TABLET | Refills: 1 | Status: SHIPPED | OUTPATIENT
Start: 2020-02-19 | End: 2020-08-12

## 2020-02-19 NOTE — TELEPHONE ENCOUNTER
Hypertensive Medications. REFILLED PER PROTOCOL.     Protocol Criteria:  · Appointment scheduled in the past 6 months or in the next 3 months  · BMP or CMP in the past 12 months  · Creatinine result < 2  Recent Outpatient Visits            2 weeks ago [place]

## 2020-03-04 ENCOUNTER — OFFICE VISIT (OUTPATIENT)
Dept: NEUROLOGY | Facility: CLINIC | Age: 55
End: 2020-03-04
Payer: COMMERCIAL

## 2020-03-04 VITALS
BODY MASS INDEX: 30.36 KG/M2 | DIASTOLIC BLOOD PRESSURE: 80 MMHG | RESPIRATION RATE: 18 BRPM | HEIGHT: 69 IN | SYSTOLIC BLOOD PRESSURE: 128 MMHG | HEART RATE: 66 BPM | WEIGHT: 205 LBS

## 2020-03-04 DIAGNOSIS — R20.0 NUMBNESS AND TINGLING: Primary | ICD-10-CM

## 2020-03-04 DIAGNOSIS — R20.2 NUMBNESS AND TINGLING: Primary | ICD-10-CM

## 2020-03-04 PROCEDURE — 99213 OFFICE O/P EST LOW 20 MIN: CPT | Performed by: OTHER

## 2020-03-22 NOTE — PROGRESS NOTES
[facility]    Vignesh Berry is a 53year old male. HPI:     Patient being seen in follow-up. I saw him in clinic last in September 2019. Since last visit, he feels overall symptoms significantly improved.   Facial numbness proximally and distally; normal bulk and tone; no drift  Sensory: intact to light touch and pinprick throughout  Reflexes: DTRs 2+ in bilateral biceps, brachioradialis, patella  Coordination / gait: no finger-nose-finger dysmetria, normal gait    ASSESSMEN

## 2020-05-16 NOTE — PROGRESS NOTES
HPI:    Patient ID: Kei Orourke is a 53year old male. HPI  Patient comes in for annual physical exam overall is doing okay except for several years palpitations chronic problem he is seen cardiology has had Holter monitor and stress test before.   So history on file.    Family History   Problem Relation Age of Onset   • Prostate Cancer Father    • Prostate Cancer Paternal Uncle    • Diabetes Maternal Grandfather    • Hypertension Mother       Social History: Social History    Tobacco Use      Smoking st osteoarthritis complication status as above not better let us know  Smoker  (primary encounter diagnosis) patient would like to quit will order Chantix told to start it on the weekend when he is not working see how is affecting him side effects explained

## 2020-05-19 RX ORDER — ERGOCALCIFEROL 1.25 MG/1
CAPSULE ORAL
Qty: 12 CAPSULE | Refills: 0 | Status: SHIPPED | OUTPATIENT
Start: 2020-05-19 | End: 2020-05-19

## 2020-05-19 RX ORDER — ERGOCALCIFEROL 1.25 MG/1
CAPSULE ORAL
Qty: 13 CAPSULE | Refills: 0 | Status: SHIPPED | OUTPATIENT
Start: 2020-05-19 | End: 2021-04-06 | Stop reason: ALTCHOICE

## 2020-08-12 RX ORDER — AMLODIPINE BESYLATE 10 MG/1
TABLET ORAL
Qty: 90 TABLET | Refills: 1 | Status: SHIPPED | OUTPATIENT
Start: 2020-08-12 | End: 2021-03-22

## 2021-02-17 ENCOUNTER — HOSPITAL ENCOUNTER (OUTPATIENT)
Dept: CT IMAGING | Facility: HOSPITAL | Age: 56
Discharge: HOME | End: 2021-02-17
Attending: INTERNAL MEDICINE
Payer: COMMERCIAL

## 2021-02-17 ENCOUNTER — APPOINTMENT (OUTPATIENT)
Dept: CT IMAGING | Facility: HOSPITAL | Age: 56
End: 2021-02-17
Attending: INTERNAL MEDICINE
Payer: COMMERCIAL

## 2021-02-17 ENCOUNTER — HOSPITAL ENCOUNTER (OUTPATIENT)
Dept: ULTRASOUND IMAGING | Facility: HOSPITAL | Age: 56
Discharge: HOME | End: 2021-02-17
Attending: INTERNAL MEDICINE
Payer: COMMERCIAL

## 2021-02-17 DIAGNOSIS — F17.200 SMOKER: ICD-10-CM

## 2021-02-17 PROCEDURE — 76706 US ABDL AORTA SCREEN AAA: CPT | Performed by: INTERNAL MEDICINE

## 2021-02-17 PROCEDURE — 71271 CT THORAX LUNG CANCER SCR C-: CPT | Performed by: INTERNAL MEDICINE

## 2021-03-09 RX ORDER — AMLODIPINE BESYLATE 10 MG/1
TABLET ORAL
Qty: 90 TABLET | Refills: 1 | OUTPATIENT
Start: 2021-03-09

## 2021-03-22 RX ORDER — AMLODIPINE BESYLATE 10 MG/1
10 TABLET ORAL DAILY
Qty: 30 TABLET | Refills: 0 | Status: SHIPPED | OUTPATIENT
Start: 2021-03-22 | End: 2021-03-23

## 2021-03-23 ENCOUNTER — HOSPITAL ENCOUNTER (OUTPATIENT)
Dept: CT IMAGING | Age: 56
Discharge: HOME | End: 2021-03-23
Attending: HOSPITALIST

## 2021-03-23 DIAGNOSIS — I25.10 CAD (CORONARY ARTERY DISEASE): ICD-10-CM

## 2021-03-23 RX ORDER — AMLODIPINE BESYLATE 10 MG/1
TABLET ORAL
Qty: 90 TABLET | Refills: 0 | Status: SHIPPED | OUTPATIENT
Start: 2021-03-23 | End: 2021-06-20

## 2021-03-23 NOTE — PROGRESS NOTES
Pt seen for [facility]    PRELIMINARY HUNKF=[id number]    BH=747/82    Cholestec labs as follows: May 16, 2020  LE=750  HDL=38  ICL=192  FB=033  GLUCOSE=97 fasting    Pt states has experienced irregular heart rate. Has informed PCP/cardiology about this.   Pt to be sche

## 2021-03-25 ENCOUNTER — OFFICE VISIT (OUTPATIENT)
Dept: INTERNAL MEDICINE CLINIC | Facility: CLINIC | Age: 56
End: 2021-03-25
Payer: COMMERCIAL

## 2021-03-25 VITALS
RESPIRATION RATE: 18 BRPM | HEART RATE: 78 BPM | BODY MASS INDEX: 31.7 KG/M2 | HEIGHT: 69 IN | DIASTOLIC BLOOD PRESSURE: 74 MMHG | OXYGEN SATURATION: 98 % | TEMPERATURE: 98 F | WEIGHT: 214 LBS | SYSTOLIC BLOOD PRESSURE: 136 MMHG

## 2021-03-25 DIAGNOSIS — R93.1 ELEVATED CORONARY ARTERY CALCIUM SCORE: Primary | ICD-10-CM

## 2021-03-25 DIAGNOSIS — E78.00 HIGH CHOLESTEROL: ICD-10-CM

## 2021-03-25 DIAGNOSIS — I10 ESSENTIAL HYPERTENSION: ICD-10-CM

## 2021-03-25 DIAGNOSIS — R79.89 LOW VITAMIN D LEVEL: ICD-10-CM

## 2021-03-25 PROCEDURE — 3078F DIAST BP <80 MM HG: CPT | Performed by: INTERNAL MEDICINE

## 2021-03-25 PROCEDURE — 99214 OFFICE O/P EST MOD 30 MIN: CPT | Performed by: INTERNAL MEDICINE

## 2021-03-25 PROCEDURE — 3008F BODY MASS INDEX DOCD: CPT | Performed by: INTERNAL MEDICINE

## 2021-03-25 PROCEDURE — 3075F SYST BP GE 130 - 139MM HG: CPT | Performed by: INTERNAL MEDICINE

## 2021-03-25 RX ORDER — ATORVASTATIN CALCIUM 40 MG/1
40 TABLET, FILM COATED ORAL NIGHTLY
Qty: 90 TABLET | Refills: 1 | Status: SHIPPED | OUTPATIENT
Start: 2021-03-25 | End: 2021-09-15

## 2021-03-26 ENCOUNTER — LAB ENCOUNTER (OUTPATIENT)
Dept: LAB | Facility: HOSPITAL | Age: 56
End: 2021-03-26
Attending: HOSPITALIST
Payer: COMMERCIAL

## 2021-03-26 DIAGNOSIS — I10 ESSENTIAL HYPERTENSION: ICD-10-CM

## 2021-03-26 DIAGNOSIS — R79.89 LOW VITAMIN D LEVEL: ICD-10-CM

## 2021-03-26 DIAGNOSIS — E78.00 HIGH CHOLESTEROL: ICD-10-CM

## 2021-03-26 DIAGNOSIS — I25.10 CORONARY ATHEROSCLEROSIS OF NATIVE CORONARY ARTERY: Primary | ICD-10-CM

## 2021-03-26 DIAGNOSIS — R93.1 ELEVATED CORONARY ARTERY CALCIUM SCORE: ICD-10-CM

## 2021-03-26 LAB
25(OH)D3 SERPL-MCNC: 34.6 NG/ML (ref 30–100)
ALBUMIN SERPL-MCNC: 4 G/DL (ref 3.4–5)
ALBUMIN/GLOB SERPL: 0.9 {RATIO} (ref 1–2)
ALP LIVER SERPL-CCNC: 76 U/L
ALT SERPL-CCNC: 41 U/L
ANION GAP SERPL CALC-SCNC: 4 MMOL/L (ref 0–18)
AST SERPL-CCNC: 16 U/L (ref 15–37)
BASOPHILS # BLD AUTO: 0.04 X10(3) UL (ref 0–0.2)
BASOPHILS NFR BLD AUTO: 0.6 %
BILIRUB SERPL-MCNC: 0.5 MG/DL (ref 0.1–2)
BUN BLD-MCNC: 13 MG/DL (ref 7–18)
BUN/CREAT SERPL: 17.1 (ref 10–20)
CALCIUM BLD-MCNC: 9.2 MG/DL (ref 8.5–10.1)
CHLORIDE SERPL-SCNC: 104 MMOL/L (ref 98–112)
CHOLEST SMN-MCNC: 225 MG/DL (ref ?–200)
CO2 SERPL-SCNC: 30 MMOL/L (ref 21–32)
CREAT BLD-MCNC: 0.76 MG/DL
DEPRECATED RDW RBC AUTO: 42.6 FL (ref 35.1–46.3)
EOSINOPHIL # BLD AUTO: 0.16 X10(3) UL (ref 0–0.7)
EOSINOPHIL NFR BLD AUTO: 2.5 %
ERYTHROCYTE [DISTWIDTH] IN BLOOD BY AUTOMATED COUNT: 12.9 % (ref 11–15)
GLOBULIN PLAS-MCNC: 4.3 G/DL (ref 2.8–4.4)
GLUCOSE BLD-MCNC: 96 MG/DL (ref 70–99)
HCT VFR BLD AUTO: 48.2 %
HDLC SERPL-MCNC: 45 MG/DL (ref 40–59)
HGB BLD-MCNC: 16.4 G/DL
IMM GRANULOCYTES # BLD AUTO: 0.02 X10(3) UL (ref 0–1)
IMM GRANULOCYTES NFR BLD: 0.3 %
INR BLD: 1.04 (ref 0.9–1.2)
LDLC SERPL CALC-MCNC: 153 MG/DL (ref ?–100)
LYMPHOCYTES # BLD AUTO: 1.44 X10(3) UL (ref 1–4)
LYMPHOCYTES NFR BLD AUTO: 22.9 %
M PROTEIN MFR SERPL ELPH: 8.3 G/DL (ref 6.4–8.2)
MCH RBC QN AUTO: 30.9 PG (ref 26–34)
MCHC RBC AUTO-ENTMCNC: 34 G/DL (ref 31–37)
MCV RBC AUTO: 90.9 FL
MONOCYTES # BLD AUTO: 0.44 X10(3) UL (ref 0.1–1)
MONOCYTES NFR BLD AUTO: 7 %
NEUTROPHILS # BLD AUTO: 4.2 X10 (3) UL (ref 1.5–7.7)
NEUTROPHILS # BLD AUTO: 4.2 X10(3) UL (ref 1.5–7.7)
NEUTROPHILS NFR BLD AUTO: 66.7 %
NONHDLC SERPL-MCNC: 180 MG/DL (ref ?–130)
OSMOLALITY SERPL CALC.SUM OF ELEC: 286 MOSM/KG (ref 275–295)
PATIENT FASTING Y/N/NP: YES
PATIENT FASTING Y/N/NP: YES
PLATELET # BLD AUTO: 245 10(3)UL (ref 150–450)
POTASSIUM SERPL-SCNC: 3.8 MMOL/L (ref 3.5–5.1)
PROTHROMBIN TIME: 13.4 SECONDS (ref 11.8–14.5)
RBC # BLD AUTO: 5.3 X10(6)UL
SODIUM SERPL-SCNC: 138 MMOL/L (ref 136–145)
TRIGL SERPL-MCNC: 137 MG/DL (ref 30–149)
VLDLC SERPL CALC-MCNC: 27 MG/DL (ref 0–30)
WBC # BLD AUTO: 6.3 X10(3) UL (ref 4–11)

## 2021-03-26 PROCEDURE — 85610 PROTHROMBIN TIME: CPT

## 2021-03-26 PROCEDURE — 36415 COLL VENOUS BLD VENIPUNCTURE: CPT

## 2021-03-26 PROCEDURE — 80053 COMPREHEN METABOLIC PANEL: CPT

## 2021-03-26 PROCEDURE — 80061 LIPID PANEL: CPT

## 2021-03-26 PROCEDURE — 82306 VITAMIN D 25 HYDROXY: CPT

## 2021-03-26 PROCEDURE — 85025 COMPLETE CBC W/AUTO DIFF WBC: CPT

## 2021-03-26 NOTE — PROGRESS NOTES
HPI/Subjective:   Patient ID: Ritta Leventhal is a 63year old male.     HPI  Patient comes in today for follow-up he has recently done CT lungs as a preventative due to smoking history incidentally found to have coronary calcifications was referred to cardio 180 tablet 1   • ASPIRIN 81 OR Take 81 mg by mouth daily. Allergies:No Known Allergies    Objective:   Physical Exam  Vitals and nursing note reviewed. Constitutional:       Appearance: He is well-developed.    HENT:      Head: Normocephalic and atr

## 2021-04-05 ENCOUNTER — LAB ENCOUNTER (OUTPATIENT)
Dept: LAB | Age: 56
End: 2021-04-05
Attending: INTERNAL MEDICINE
Payer: COMMERCIAL

## 2021-04-05 DIAGNOSIS — Z01.818 PREOP TESTING: ICD-10-CM

## 2021-04-06 RX ORDER — BIOTIN 1 MG
1 TABLET ORAL DAILY
COMMUNITY

## 2021-04-06 RX ORDER — ISOSORBIDE MONONITRATE 60 MG/1
60 TABLET, EXTENDED RELEASE ORAL DAILY
COMMUNITY
End: 2021-04-22

## 2021-04-08 ENCOUNTER — HOSPITAL ENCOUNTER (INPATIENT)
Dept: INTERVENTIONAL RADIOLOGY/VASCULAR | Facility: HOSPITAL | Age: 56
LOS: 5 days | Discharge: HOME HEALTH | DRG: 234 | End: 2021-04-13
Attending: INTERNAL MEDICINE | Admitting: INTERNAL MEDICINE
Payer: COMMERCIAL

## 2021-04-08 ENCOUNTER — APPOINTMENT (OUTPATIENT)
Dept: ULTRASOUND IMAGING | Facility: HOSPITAL | Age: 56
DRG: 234 | End: 2021-04-08
Attending: NURSE PRACTITIONER
Payer: COMMERCIAL

## 2021-04-08 ENCOUNTER — HOSPITAL ENCOUNTER (OUTPATIENT)
Dept: GENERAL RADIOLOGY | Facility: HOSPITAL | Age: 56
Discharge: HOME | DRG: 234 | End: 2021-04-08
Attending: NURSE PRACTITIONER | Admitting: INTERNAL MEDICINE
Payer: COMMERCIAL

## 2021-04-08 ENCOUNTER — APPOINTMENT (OUTPATIENT)
Dept: CV DIAGNOSTICS | Facility: HOSPITAL | Age: 56
DRG: 234 | End: 2021-04-08
Attending: NURSE PRACTITIONER
Payer: COMMERCIAL

## 2021-04-08 ENCOUNTER — ANESTHESIA EVENT (OUTPATIENT)
Dept: SURGERY | Facility: HOSPITAL | Age: 56
DRG: 234 | End: 2021-04-08
Payer: COMMERCIAL

## 2021-04-08 DIAGNOSIS — Z01.818 PREOP TESTING: Primary | ICD-10-CM

## 2021-04-08 DIAGNOSIS — I25.10 CORONARY ATHEROSCLEROSIS OF NATIVE CORONARY ARTERY: ICD-10-CM

## 2021-04-08 PROCEDURE — 4A023N7 MEASUREMENT OF CARDIAC SAMPLING AND PRESSURE, LEFT HEART, PERCUTANEOUS APPROACH: ICD-10-PCS | Performed by: INTERNAL MEDICINE

## 2021-04-08 PROCEDURE — 71046 X-RAY EXAM CHEST 2 VIEWS: CPT | Performed by: NURSE PRACTITIONER

## 2021-04-08 PROCEDURE — 93306 TTE W/DOPPLER COMPLETE: CPT | Performed by: NURSE PRACTITIONER

## 2021-04-08 PROCEDURE — B2111ZZ FLUOROSCOPY OF MULTIPLE CORONARY ARTERIES USING LOW OSMOLAR CONTRAST: ICD-10-PCS | Performed by: INTERNAL MEDICINE

## 2021-04-08 PROCEDURE — 99223 1ST HOSP IP/OBS HIGH 75: CPT | Performed by: INTERNAL MEDICINE

## 2021-04-08 PROCEDURE — 93880 EXTRACRANIAL BILAT STUDY: CPT | Performed by: NURSE PRACTITIONER

## 2021-04-08 RX ORDER — NITROGLYCERIN 20 MG/100ML
INJECTION INTRAVENOUS
Status: COMPLETED
Start: 2021-04-08 | End: 2021-04-08

## 2021-04-08 RX ORDER — MELATONIN
3 NIGHTLY PRN
Status: DISCONTINUED | OUTPATIENT
Start: 2021-04-08 | End: 2021-04-10

## 2021-04-08 RX ORDER — DEXMEDETOMIDINE HYDROCHLORIDE 4 UG/ML
INJECTION, SOLUTION INTRAVENOUS CONTINUOUS
Status: DISCONTINUED | OUTPATIENT
Start: 2021-04-09 | End: 2021-04-09

## 2021-04-08 RX ORDER — LIDOCAINE HYDROCHLORIDE 20 MG/ML
INJECTION, SOLUTION EPIDURAL; INFILTRATION; INTRACAUDAL; PERINEURAL
Status: COMPLETED
Start: 2021-04-08 | End: 2021-04-08

## 2021-04-08 RX ORDER — MIDAZOLAM HYDROCHLORIDE 1 MG/ML
INJECTION INTRAMUSCULAR; INTRAVENOUS
Status: COMPLETED
Start: 2021-04-08 | End: 2021-04-08

## 2021-04-08 RX ORDER — HEPARIN SODIUM 1000 [USP'U]/ML
INJECTION, SOLUTION INTRAVENOUS; SUBCUTANEOUS
Status: COMPLETED
Start: 2021-04-08 | End: 2021-04-08

## 2021-04-08 RX ORDER — FAMOTIDINE 20 MG/1
20 TABLET ORAL ONCE
Status: COMPLETED | OUTPATIENT
Start: 2021-04-09 | End: 2021-04-09

## 2021-04-08 RX ORDER — GABAPENTIN 300 MG/1
300 CAPSULE ORAL ONCE
Status: COMPLETED | OUTPATIENT
Start: 2021-04-08 | End: 2021-04-08

## 2021-04-08 RX ORDER — LORAZEPAM 1 MG/1
2 TABLET ORAL ONCE
Status: COMPLETED | OUTPATIENT
Start: 2021-04-09 | End: 2021-04-09

## 2021-04-08 RX ORDER — METOCLOPRAMIDE 10 MG/1
10 TABLET ORAL ONCE
Status: COMPLETED | OUTPATIENT
Start: 2021-04-09 | End: 2021-04-09

## 2021-04-08 RX ORDER — VERAPAMIL HYDROCHLORIDE 2.5 MG/ML
INJECTION, SOLUTION INTRAVENOUS
Status: COMPLETED
Start: 2021-04-08 | End: 2021-04-08

## 2021-04-08 RX ORDER — CEFAZOLIN SODIUM/WATER 2 G/20 ML
2 SYRINGE (ML) INTRAVENOUS
Status: COMPLETED | OUTPATIENT
Start: 2021-04-09 | End: 2021-04-09

## 2021-04-08 RX ORDER — SODIUM CHLORIDE 9 MG/ML
83 INJECTION, SOLUTION INTRAVENOUS CONTINUOUS
Status: DISCONTINUED | OUTPATIENT
Start: 2021-04-09 | End: 2021-04-11

## 2021-04-08 RX ORDER — SODIUM CHLORIDE 9 MG/ML
INJECTION, SOLUTION INTRAVENOUS
Status: ACTIVE | OUTPATIENT
Start: 2021-04-08 | End: 2021-04-08

## 2021-04-08 RX ORDER — POTASSIUM CHLORIDE 20 MEQ/1
40 TABLET, EXTENDED RELEASE ORAL EVERY 4 HOURS
Status: COMPLETED | OUTPATIENT
Start: 2021-04-08 | End: 2021-04-08

## 2021-04-08 RX ADMIN — Medication 25 MG: at 15:40:00

## 2021-04-08 RX ADMIN — POTASSIUM CHLORIDE 40 MEQ: 20 TABLET, EXTENDED RELEASE ORAL at 20:49:00

## 2021-04-08 RX ADMIN — POTASSIUM CHLORIDE 40 MEQ: 20 TABLET, EXTENDED RELEASE ORAL at 15:41:00

## 2021-04-08 RX ADMIN — GABAPENTIN 300 MG: 300 CAPSULE ORAL at 20:49:00

## 2021-04-08 NOTE — PAYOR COMM NOTE
--------------  ADMISSION REVIEW     Payor: [employer] POS/ALICE  Subscriber #:  [id number]  Authorization Number: N/A    Admit date: 4/8/21  Admit time:  1:48 PM       Admitting Physician: Yanira Gresham DO  Attending Physician:  Tiago Paredes MD  [place] round and reactive. The sclera are nonicteric. Hearing appears adequate in both ears. Neck is soft supple with adequate range of motion no appreciable adenopathy mass or JVD. Carotids are 2+ without bruits.   Lungs are clear to auscultation and percussi (BACTROBAN) 2% nasal ointment OINT 1 Application     Date Action Dose Route User    4/8/2021 9332 Given 1 Application Nasal (Bilateral Nares) Jabier Moscoso RN      Potassium Chloride ER (K-DUR M20) CR tab 40 mEq     Date Action Dose Route User    4/8/2

## 2021-04-08 NOTE — H&P
[facility]    History and Physical    Brigitte Crenshaw Patient Status:  Inpatient    1965 MRN [id number]   Location [facility] 2W/SW Attending Den Kee MD   Hosp Day # 0 PCP Duke Lora MD     Date:  2021  Date of Ad Constitutional: Negative for activity change, appetite change, chills, diaphoresis, fatigue, fever and unexpected weight change. HENT: Negative. Eyes: Negative.     Respiratory: Negative for apnea, cough, choking, chest tightness, shortness of jeffry Coordination and gait normal.   Skin: Skin is warm. Psychiatric: He has a normal mood and affect.          Results:     Lab Results   Component Value Date    WBC 7.1 04/08/2021    HGB 14.9 04/08/2021    HCT 42.9 04/08/2021    .0 04/08/2021    CREAT

## 2021-04-08 NOTE — IVS NOTE
Bedside handoff to [facility] RN  Pt denies any pain or discomfort  Procedural site dry and intact, no bleeding or swelling noted. Activity restriction and bedrest status reviewed. Patient went down for X-Ray.  MRSA and urine specimen sent to lab. 2D Echo done

## 2021-04-08 NOTE — CONSULTS
[facility]    Report of Consultation    Sherron Nevarez Patient Status:  Inpatient    1965 MRN [id number]   Location [facility] 2W/SW Attending Jared Car DO   Hosp Day # 0 PCP Cathryn Oppenheim, MD     Date of Admission:   standard drinks    Drug use: No          Current Medications:  0.9% NaCl infusion, , Intravenous, On Call  mupirocin (BACTROBAN) 2% nasal ointment OINT 1 Application, 1 Application, Nasal, Now then every 0500  melatonin tab 3 mg, 3 mg, Oral, Nightly PRN  m %.  Intake/Output:   Last 3 shifts: No intake/output data recorded.    This shift: I/O this shift:  In: 240 [P.O.:240]  Out: 500 [Urine:500]     Vent Settings:      Hemodynamic parameters (last 24 hours):      Scheduled Meds:   • sodium chloride   Intraveno B12 383 11/11/2017         Imaging:  XR CHEST PA + LAT CHEST (CPT=71046)    Result Date: 4/8/2021  CONCLUSION:  1.  No acute cardiopulmonary disease    Dictated by (CST): Noel Ann MD on 4/08/2021 at 1:34 PM     Finalized by (CST): Baljit Vargas

## 2021-04-08 NOTE — PROGRESS NOTES
Met with patient discussed pre and post-op open heart for tomorrow. NPO after midnight. Open heart binder reviewed. Questions answered. Consents signed.  Further testing pending

## 2021-04-08 NOTE — OPERATIVE REPORT
Preop diagnosis: cad  Post op diagnosis: cad  Procedures: [facility] cors  Findings: 90% heavily calcifed and ectatic mid to distal rca, 90% mid lad, 95% mid lcx, d1 70% edp 7   CABG eval  EBL: 20 mls  Specimens: None

## 2021-04-08 NOTE — H&P
The above referenced H&P was reviewed by ALIZA ESPARZA DO on 4/8/2021, the patient was examined and no significant changes have occurred in the patient's condition since the H&P was performed.   Risks and benefits were discussed, proceed with procedure as

## 2021-04-08 NOTE — CM/SW NOTE
CM received MDO for \"Surgery. \"  CM met with pt at bedside. CM verified address and telephone number. Pt states that he lives in the second floor apartment of  2 flat alone. There are 3 full flights of stairs up. Pts brother lives on the first floor.

## 2021-04-08 NOTE — ANESTHESIA PREPROCEDURE EVALUATION
Anesthesia PreOp Note    HPI:     Valencia Nash is a 63year old male who presents for preoperative consultation requested by: Lorin Martinez MD    Date of Surgery: 4/9/2021    Procedure(s):  coronary artery bypass graft, possible endoscopic vein harvest  In differently: Take 75 mg by mouth daily as needed.  ), Disp: 180 tablet, Rfl: 1      0.9% NaCl infusion, , Intravenous, On Call, Deyvi Kirk, DO  mupirocin (BACTROBAN) 2% nasal ointment OINT 1 Application, 1 Application, Nasal, Now then every 0500, Molon Needs:       Lack of Transportation (Medical):       Lack of Transportation (Non-Medical):   Physical Activity:       Days of Exercise per Week:       Minutes of Exercise per Session:   Stress:       Feeling of Stress :   Social Connections:       Raj Pearce normal exam  Exercise tolerance: good  (+) hypertension, CAD,     NYHA Classification: I    Neuro/Psych - negative ROS   (+)  neuromuscular disease,       GI/Hepatic/Renal - negative ROS     Endo/Other - negative ROS   Abdominal              Anesthesia Mac

## 2021-04-08 NOTE — PROCEDURES
[facility]    PATIENT'S NAME: Luis Fernando Heath   ATTENDING PHYSICIAN: Deyvi Kirk DO   OPERATING PHYSICIAN: Deyvi Kirk DO   PATIENT ACCOUNT#:   [de-identified]    LOCATION:  [place] [address]  MEDICAL RECORD #:   [id number]       DATE OF B bend of the RCA. The disease involves the ostium of 2 small RV marginal branches. The RPL and the RPDA branches are very large, extending all the way to the apex.     Left ventricular end-diastolic pressure is 7 mmHg and no gradient across the aortic valv

## 2021-04-09 ENCOUNTER — ANESTHESIA (OUTPATIENT)
Dept: SURGERY | Facility: HOSPITAL | Age: 56
DRG: 234 | End: 2021-04-09
Payer: COMMERCIAL

## 2021-04-09 ENCOUNTER — APPOINTMENT (OUTPATIENT)
Dept: GENERAL RADIOLOGY | Facility: HOSPITAL | Age: 56
DRG: 234 | End: 2021-04-09
Attending: CLINICAL NURSE SPECIALIST
Payer: COMMERCIAL

## 2021-04-09 PROCEDURE — 5A1223Z PERFORMANCE OF CARDIAC PACING, CONTINUOUS: ICD-10-PCS | Performed by: THORACIC SURGERY (CARDIOTHORACIC VASCULAR SURGERY)

## 2021-04-09 PROCEDURE — 71045 X-RAY EXAM CHEST 1 VIEW: CPT | Performed by: CLINICAL NURSE SPECIALIST

## 2021-04-09 PROCEDURE — 93312 ECHO TRANSESOPHAGEAL: CPT | Performed by: ANESTHESIOLOGY

## 2021-04-09 PROCEDURE — 06BQ4ZZ EXCISION OF LEFT SAPHENOUS VEIN, PERCUTANEOUS ENDOSCOPIC APPROACH: ICD-10-PCS | Performed by: THORACIC SURGERY (CARDIOTHORACIC VASCULAR SURGERY)

## 2021-04-09 PROCEDURE — 02100Z9 BYPASS CORONARY ARTERY, ONE ARTERY FROM LEFT INTERNAL MAMMARY, OPEN APPROACH: ICD-10-PCS | Performed by: THORACIC SURGERY (CARDIOTHORACIC VASCULAR SURGERY)

## 2021-04-09 PROCEDURE — B24BZZ4 ULTRASONOGRAPHY OF HEART WITH AORTA, TRANSESOPHAGEAL: ICD-10-PCS | Performed by: THORACIC SURGERY (CARDIOTHORACIC VASCULAR SURGERY)

## 2021-04-09 PROCEDURE — 76942 ECHO GUIDE FOR BIOPSY: CPT | Performed by: ANESTHESIOLOGY

## 2021-04-09 PROCEDURE — 021109W BYPASS CORONARY ARTERY, TWO ARTERIES FROM AORTA WITH AUTOLOGOUS VENOUS TISSUE, OPEN APPROACH: ICD-10-PCS | Performed by: THORACIC SURGERY (CARDIOTHORACIC VASCULAR SURGERY)

## 2021-04-09 PROCEDURE — 99233 SBSQ HOSP IP/OBS HIGH 50: CPT | Performed by: INTERNAL MEDICINE

## 2021-04-09 DEVICE — IMPLANTABLE DEVICE
Type: IMPLANTABLE DEVICE | Site: CHEST | Status: FUNCTIONAL
Brand: STERNALOCK® BLU SYSTEM

## 2021-04-09 RX ORDER — DOBUTAMINE HYDROCHLORIDE 100 MG/100ML
INJECTION INTRAVENOUS CONTINUOUS PRN
Status: DISCONTINUED | OUTPATIENT
Start: 2021-04-09 | End: 2021-04-09 | Stop reason: SURG

## 2021-04-09 RX ORDER — ACETAMINOPHEN 325 MG/1
650 TABLET ORAL EVERY 4 HOURS PRN
Status: DISCONTINUED | OUTPATIENT
Start: 2021-04-09 | End: 2021-04-13

## 2021-04-09 RX ORDER — FAMOTIDINE 10 MG/ML
20 INJECTION, SOLUTION INTRAVENOUS 2 TIMES DAILY
Status: DISCONTINUED | OUTPATIENT
Start: 2021-04-09 | End: 2021-04-13

## 2021-04-09 RX ORDER — DEXTROSE MONOHYDRATE 25 G/50ML
50 INJECTION, SOLUTION INTRAVENOUS
Status: DISCONTINUED | OUTPATIENT
Start: 2021-04-09 | End: 2021-04-13

## 2021-04-09 RX ORDER — DOBUTAMINE HYDROCHLORIDE 200 MG/100ML
INJECTION INTRAVENOUS CONTINUOUS PRN
Status: DISCONTINUED | OUTPATIENT
Start: 2021-04-09 | End: 2021-04-13

## 2021-04-09 RX ORDER — GLYCOPYRROLATE 0.2 MG/ML
0.6 INJECTION, SOLUTION INTRAMUSCULAR; INTRAVENOUS ONCE
Status: COMPLETED | OUTPATIENT
Start: 2021-04-09 | End: 2021-04-09

## 2021-04-09 RX ORDER — ACETAMINOPHEN 160 MG/5ML
650 SOLUTION ORAL EVERY 6 HOURS PRN
Status: DISCONTINUED | OUTPATIENT
Start: 2021-04-09 | End: 2021-04-13

## 2021-04-09 RX ORDER — DOCUSATE SODIUM 100 MG/1
100 CAPSULE, LIQUID FILLED ORAL 2 TIMES DAILY
Status: DISCONTINUED | OUTPATIENT
Start: 2021-04-10 | End: 2021-04-13

## 2021-04-09 RX ORDER — CEFAZOLIN SODIUM 1 G/3ML
INJECTION, POWDER, FOR SOLUTION INTRAMUSCULAR; INTRAVENOUS AS NEEDED
Status: DISCONTINUED | OUTPATIENT
Start: 2021-04-09 | End: 2021-04-09 | Stop reason: HOSPADM

## 2021-04-09 RX ORDER — MAGNESIUM HYDROXIDE 1200 MG/15ML
LIQUID ORAL CONTINUOUS PRN
Status: DISCONTINUED | OUTPATIENT
Start: 2021-04-09 | End: 2021-04-09 | Stop reason: HOSPADM

## 2021-04-09 RX ORDER — NITROGLYCERIN 20 MG/100ML
INJECTION INTRAVENOUS CONTINUOUS PRN
Status: DISCONTINUED | OUTPATIENT
Start: 2021-04-09 | End: 2021-04-13

## 2021-04-09 RX ORDER — HEPARIN SODIUM 1000 [USP'U]/ML
INJECTION, SOLUTION INTRAVENOUS; SUBCUTANEOUS AS NEEDED
Status: DISCONTINUED | OUTPATIENT
Start: 2021-04-09 | End: 2021-04-09 | Stop reason: SURG

## 2021-04-09 RX ORDER — MILRINONE LACTATE 0.2 MG/ML
INJECTION, SOLUTION INTRAVENOUS AS NEEDED
Status: DISCONTINUED | OUTPATIENT
Start: 2021-04-09 | End: 2021-04-13

## 2021-04-09 RX ORDER — CEFAZOLIN SODIUM/WATER 2 G/20 ML
2 SYRINGE (ML) INTRAVENOUS EVERY 8 HOURS
Status: COMPLETED | OUTPATIENT
Start: 2021-04-09 | End: 2021-04-10

## 2021-04-09 RX ORDER — LIDOCAINE HYDROCHLORIDE 10 MG/ML
INJECTION, SOLUTION EPIDURAL; INFILTRATION; INTRACAUDAL; PERINEURAL AS NEEDED
Status: DISCONTINUED | OUTPATIENT
Start: 2021-04-09 | End: 2021-04-09 | Stop reason: SURG

## 2021-04-09 RX ORDER — POTASSIUM CHLORIDE 14.9 MG/ML
20 INJECTION INTRAVENOUS AS NEEDED
Status: DISCONTINUED | OUTPATIENT
Start: 2021-04-09 | End: 2021-04-13

## 2021-04-09 RX ORDER — BISACODYL 10 MG
10 SUPPOSITORY, RECTAL RECTAL
Status: DISCONTINUED | OUTPATIENT
Start: 2021-04-09 | End: 2021-04-10

## 2021-04-09 RX ORDER — CALCIUM CHLORIDE 100 MG/ML
INJECTION INTRAVENOUS; INTRAVENTRICULAR AS NEEDED
Status: DISCONTINUED | OUTPATIENT
Start: 2021-04-09 | End: 2021-04-09 | Stop reason: SURG

## 2021-04-09 RX ORDER — SENNOSIDES 8.6 MG
8.6 TABLET ORAL 2 TIMES DAILY
Status: DISCONTINUED | OUTPATIENT
Start: 2021-04-10 | End: 2021-04-13

## 2021-04-09 RX ORDER — VANCOMYCIN HYDROCHLORIDE 1 G/20ML
INJECTION, POWDER, LYOPHILIZED, FOR SOLUTION INTRAVENOUS AS NEEDED
Status: DISCONTINUED | OUTPATIENT
Start: 2021-04-09 | End: 2021-04-09 | Stop reason: HOSPADM

## 2021-04-09 RX ORDER — CHLORHEXIDINE GLUCONATE 0.12 MG/ML
15 RINSE ORAL
Status: DISCONTINUED | OUTPATIENT
Start: 2021-04-09 | End: 2021-04-09

## 2021-04-09 RX ORDER — CHLORHEXIDINE GLUCONATE 0.12 MG/ML
15 RINSE ORAL 2 TIMES DAILY
Status: DISCONTINUED | OUTPATIENT
Start: 2021-04-09 | End: 2021-04-13

## 2021-04-09 RX ORDER — PROTAMINE SULFATE 10 MG/ML
INJECTION, SOLUTION INTRAVENOUS AS NEEDED
Status: DISCONTINUED | OUTPATIENT
Start: 2021-04-09 | End: 2021-04-09 | Stop reason: SURG

## 2021-04-09 RX ORDER — FAMOTIDINE 20 MG/1
20 TABLET ORAL 2 TIMES DAILY
Status: DISCONTINUED | OUTPATIENT
Start: 2021-04-09 | End: 2021-04-13

## 2021-04-09 RX ORDER — ALBUMIN, HUMAN INJ 5% 5 %
250 SOLUTION INTRAVENOUS ONCE AS NEEDED
Status: COMPLETED | OUTPATIENT
Start: 2021-04-09 | End: 2021-04-09

## 2021-04-09 RX ORDER — BISACODYL 10 MG
10 SUPPOSITORY, RECTAL RECTAL
Status: DISCONTINUED | OUTPATIENT
Start: 2021-04-09 | End: 2021-04-13

## 2021-04-09 RX ORDER — MELATONIN
3 NIGHTLY PRN
Status: DISCONTINUED | OUTPATIENT
Start: 2021-04-10 | End: 2021-04-13

## 2021-04-09 RX ORDER — HYDROMORPHONE HYDROCHLORIDE 1 MG/ML
0.4 INJECTION, SOLUTION INTRAMUSCULAR; INTRAVENOUS; SUBCUTANEOUS EVERY 2 HOUR PRN
Status: DISCONTINUED | OUTPATIENT
Start: 2021-04-09 | End: 2021-04-13

## 2021-04-09 RX ORDER — DEXMEDETOMIDINE HYDROCHLORIDE 4 UG/ML
INJECTION, SOLUTION INTRAVENOUS CONTINUOUS
Status: DISCONTINUED | OUTPATIENT
Start: 2021-04-09 | End: 2021-04-10

## 2021-04-09 RX ORDER — SODIUM PHOSPHATE, DIBASIC AND SODIUM PHOSPHATE, MONOBASIC 7; 19 G/133ML; G/133ML
1 ENEMA RECTAL ONCE AS NEEDED
Status: DISCONTINUED | OUTPATIENT
Start: 2021-04-09 | End: 2021-04-13

## 2021-04-09 RX ORDER — SODIUM CHLORIDE, SODIUM LACTATE, POTASSIUM CHLORIDE, CALCIUM CHLORIDE 600; 310; 30; 20 MG/100ML; MG/100ML; MG/100ML; MG/100ML
INJECTION, SOLUTION INTRAVENOUS CONTINUOUS PRN
Status: DISCONTINUED | OUTPATIENT
Start: 2021-04-09 | End: 2021-04-09 | Stop reason: SURG

## 2021-04-09 RX ORDER — POLYETHYLENE GLYCOL 3350 17 G/17G
17 POWDER, FOR SOLUTION ORAL DAILY PRN
Status: DISCONTINUED | OUTPATIENT
Start: 2021-04-09 | End: 2021-04-10

## 2021-04-09 RX ORDER — METOCLOPRAMIDE HYDROCHLORIDE 5 MG/ML
10 INJECTION INTRAMUSCULAR; INTRAVENOUS EVERY 6 HOURS
Status: DISCONTINUED | OUTPATIENT
Start: 2021-04-09 | End: 2021-04-12

## 2021-04-09 RX ORDER — ACETAMINOPHEN 325 MG/1
650 TABLET ORAL EVERY 6 HOURS PRN
Status: DISCONTINUED | OUTPATIENT
Start: 2021-04-09 | End: 2021-04-13

## 2021-04-09 RX ORDER — ASCORBIC ACID 500 MG
500 TABLET ORAL 3 TIMES DAILY
Status: DISCONTINUED | OUTPATIENT
Start: 2021-04-10 | End: 2021-04-13

## 2021-04-09 RX ORDER — HYDROCODONE BITARTRATE AND ACETAMINOPHEN 5; 325 MG/1; MG/1
2 TABLET ORAL EVERY 4 HOURS PRN
Status: DISCONTINUED | OUTPATIENT
Start: 2021-04-09 | End: 2021-04-13

## 2021-04-09 RX ORDER — HYDROMORPHONE HYDROCHLORIDE 1 MG/ML
0.8 INJECTION, SOLUTION INTRAMUSCULAR; INTRAVENOUS; SUBCUTANEOUS EVERY 2 HOUR PRN
Status: DISCONTINUED | OUTPATIENT
Start: 2021-04-09 | End: 2021-04-13

## 2021-04-09 RX ORDER — ONDANSETRON 2 MG/ML
4 INJECTION INTRAMUSCULAR; INTRAVENOUS EVERY 6 HOURS PRN
Status: DISCONTINUED | OUTPATIENT
Start: 2021-04-09 | End: 2021-04-13

## 2021-04-09 RX ORDER — NEOSTIGMINE METHYLSULFATE 1 MG/ML
3 INJECTION INTRAVENOUS ONCE
Status: COMPLETED | OUTPATIENT
Start: 2021-04-09 | End: 2021-04-09

## 2021-04-09 RX ORDER — SODIUM PHOSPHATE, DIBASIC AND SODIUM PHOSPHATE, MONOBASIC 7; 19 G/133ML; G/133ML
1 ENEMA RECTAL ONCE AS NEEDED
Status: DISCONTINUED | OUTPATIENT
Start: 2021-04-09 | End: 2021-04-10

## 2021-04-09 RX ORDER — DEXTROSE AND SODIUM CHLORIDE 5; .9 G/100ML; G/100ML
INJECTION, SOLUTION INTRAVENOUS CONTINUOUS
Status: DISCONTINUED | OUTPATIENT
Start: 2021-04-09 | End: 2021-04-10

## 2021-04-09 RX ORDER — PHENYLEPHRINE HCL 10 MG/ML
VIAL (ML) INJECTION AS NEEDED
Status: DISCONTINUED | OUTPATIENT
Start: 2021-04-09 | End: 2021-04-09 | Stop reason: SURG

## 2021-04-09 RX ORDER — GABAPENTIN 300 MG/1
300 CAPSULE ORAL DAILY
Status: COMPLETED | OUTPATIENT
Start: 2021-04-10 | End: 2021-04-11

## 2021-04-09 RX ORDER — ASPIRIN 81 MG/1
81 TABLET ORAL DAILY
Status: DISCONTINUED | OUTPATIENT
Start: 2021-04-10 | End: 2021-04-13

## 2021-04-09 RX ORDER — DOXEPIN HYDROCHLORIDE 50 MG/1
1 CAPSULE ORAL DAILY
Status: DISCONTINUED | OUTPATIENT
Start: 2021-04-10 | End: 2021-04-13

## 2021-04-09 RX ORDER — HYDROCODONE BITARTRATE AND ACETAMINOPHEN 5; 325 MG/1; MG/1
1 TABLET ORAL EVERY 4 HOURS PRN
Status: DISCONTINUED | OUTPATIENT
Start: 2021-04-09 | End: 2021-04-13

## 2021-04-09 RX ORDER — POTASSIUM CHLORIDE 29.8 MG/ML
40 INJECTION INTRAVENOUS AS NEEDED
Status: DISCONTINUED | OUTPATIENT
Start: 2021-04-09 | End: 2021-04-13

## 2021-04-09 RX ORDER — MIDAZOLAM HYDROCHLORIDE 1 MG/ML
INJECTION INTRAMUSCULAR; INTRAVENOUS AS NEEDED
Status: DISCONTINUED | OUTPATIENT
Start: 2021-04-09 | End: 2021-04-09 | Stop reason: SURG

## 2021-04-09 RX ORDER — HYDROMORPHONE HYDROCHLORIDE 1 MG/ML
1.2 INJECTION, SOLUTION INTRAMUSCULAR; INTRAVENOUS; SUBCUTANEOUS EVERY 2 HOUR PRN
Status: DISCONTINUED | OUTPATIENT
Start: 2021-04-09 | End: 2021-04-13

## 2021-04-09 RX ORDER — ROCURONIUM BROMIDE 10 MG/ML
INJECTION, SOLUTION INTRAVENOUS AS NEEDED
Status: DISCONTINUED | OUTPATIENT
Start: 2021-04-09 | End: 2021-04-09 | Stop reason: SURG

## 2021-04-09 RX ORDER — MAGNESIUM SULFATE HEPTAHYDRATE 40 MG/ML
2 INJECTION, SOLUTION INTRAVENOUS AS NEEDED
Status: DISCONTINUED | OUTPATIENT
Start: 2021-04-09 | End: 2021-04-13

## 2021-04-09 RX ORDER — MAGNESIUM SULFATE 1 G/100ML
1 INJECTION INTRAVENOUS AS NEEDED
Status: DISCONTINUED | OUTPATIENT
Start: 2021-04-09 | End: 2021-04-13

## 2021-04-09 RX ORDER — POLYETHYLENE GLYCOL 3350 17 G/17G
17 POWDER, FOR SOLUTION ORAL DAILY PRN
Status: DISCONTINUED | OUTPATIENT
Start: 2021-04-09 | End: 2021-04-13

## 2021-04-09 RX ORDER — ACETAMINOPHEN 10 MG/ML
1000 INJECTION, SOLUTION INTRAVENOUS EVERY 8 HOURS
Status: COMPLETED | OUTPATIENT
Start: 2021-04-09 | End: 2021-04-10

## 2021-04-09 RX ORDER — ACETAMINOPHEN 650 MG/1
650 SUPPOSITORY RECTAL EVERY 6 HOURS PRN
Status: DISCONTINUED | OUTPATIENT
Start: 2021-04-09 | End: 2021-04-13

## 2021-04-09 RX ORDER — CLOPIDOGREL BISULFATE 75 MG/1
75 TABLET ORAL DAILY
Status: DISCONTINUED | OUTPATIENT
Start: 2021-04-10 | End: 2021-04-13

## 2021-04-09 RX ADMIN — METOCLOPRAMIDE HYDROCHLORIDE 10 MG: 5 INJECTION INTRAMUSCULAR; INTRAVENOUS at 16:01:00

## 2021-04-09 RX ADMIN — METOCLOPRAMIDE 10 MG: 10 TABLET ORAL at 06:18:00

## 2021-04-09 RX ADMIN — DEXTROSE AND SODIUM CHLORIDE 40 ML/HR: 5; .9 INJECTION, SOLUTION INTRAVENOUS at 12:32:00

## 2021-04-09 RX ADMIN — CALCIUM CHLORIDE 1 G: 100 INJECTION INTRAVENOUS; INTRAVENTRICULAR at 10:15:00

## 2021-04-09 RX ADMIN — Medication 50 ML: at 10:14:00

## 2021-04-09 RX ADMIN — METOCLOPRAMIDE HYDROCHLORIDE 10 MG: 5 INJECTION INTRAMUSCULAR; INTRAVENOUS at 12:34:00

## 2021-04-09 RX ADMIN — LIDOCAINE HYDROCHLORIDE 50 MG: 10 INJECTION, SOLUTION EPIDURAL; INFILTRATION; INTRACAUDAL; PERINEURAL at 07:07:00

## 2021-04-09 RX ADMIN — HYDROMORPHONE HYDROCHLORIDE 0.4 MG: 1 INJECTION, SOLUTION INTRAMUSCULAR; INTRAVENOUS; SUBCUTANEOUS at 20:26:00

## 2021-04-09 RX ADMIN — ACETAMINOPHEN 1000 MG: 10 INJECTION, SOLUTION INTRAVENOUS at 18:26:00

## 2021-04-09 RX ADMIN — CHLORHEXIDINE GLUCONATE 15 ML: 0.12 RINSE ORAL at 20:42:00

## 2021-04-09 RX ADMIN — LORAZEPAM 2 MG: 1 TABLET ORAL at 06:18:00

## 2021-04-09 RX ADMIN — HEPARIN SODIUM 37000 UNITS: 1000 INJECTION, SOLUTION INTRAVENOUS; SUBCUTANEOUS at 08:26:00

## 2021-04-09 RX ADMIN — FAMOTIDINE 20 MG: 10 INJECTION, SOLUTION INTRAVENOUS at 20:28:00

## 2021-04-09 RX ADMIN — DEXTROSE AND SODIUM CHLORIDE 50 ML/HR: 5; .9 INJECTION, SOLUTION INTRAVENOUS at 23:00:00

## 2021-04-09 RX ADMIN — FAMOTIDINE 20 MG: 20 TABLET ORAL at 06:19:00

## 2021-04-09 RX ADMIN — PROTAMINE SULFATE 50 MG: 10 INJECTION, SOLUTION INTRAVENOUS at 10:20:00

## 2021-04-09 RX ADMIN — ONDANSETRON 4 MG: 2 INJECTION INTRAMUSCULAR; INTRAVENOUS at 18:26:00

## 2021-04-09 RX ADMIN — ALBUMIN, HUMAN INJ 5% 250 ML: 5 SOLUTION INTRAVENOUS at 12:48:00

## 2021-04-09 RX ADMIN — DEXTROSE AND SODIUM CHLORIDE 80 ML/HR: 5; .9 INJECTION, SOLUTION INTRAVENOUS at 20:00:00

## 2021-04-09 RX ADMIN — PHENYLEPHRINE HCL 100 MCG: 10 MG/ML VIAL (ML) INJECTION at 08:32:00

## 2021-04-09 RX ADMIN — HYDROMORPHONE HYDROCHLORIDE 0.4 MG: 1 INJECTION, SOLUTION INTRAMUSCULAR; INTRAVENOUS; SUBCUTANEOUS at 15:59:00

## 2021-04-09 RX ADMIN — CEFAZOLIN SODIUM/WATER 2 G: 2 G/20 ML SYRINGE (ML) INTRAVENOUS at 07:22:00

## 2021-04-09 RX ADMIN — DEXMEDETOMIDINE HYDROCHLORIDE 0.3 MCG/KG/HR: 4 INJECTION, SOLUTION INTRAVENOUS at 13:49:00

## 2021-04-09 RX ADMIN — ROCURONIUM BROMIDE 50 MG: 10 INJECTION, SOLUTION INTRAVENOUS at 08:45:00

## 2021-04-09 RX ADMIN — SODIUM CHLORIDE 10 ML/HR: 9 INJECTION, SOLUTION INTRAVENOUS at 12:31:00

## 2021-04-09 RX ADMIN — HYDROMORPHONE HYDROCHLORIDE 0.4 MG: 1 INJECTION, SOLUTION INTRAMUSCULAR; INTRAVENOUS; SUBCUTANEOUS at 22:19:00

## 2021-04-09 RX ADMIN — HYDROMORPHONE HYDROCHLORIDE 0.4 MG: 1 INJECTION, SOLUTION INTRAMUSCULAR; INTRAVENOUS; SUBCUTANEOUS at 13:33:00

## 2021-04-09 RX ADMIN — GLYCOPYRROLATE 0.6 MG: 0.2 INJECTION, SOLUTION INTRAMUSCULAR; INTRAVENOUS at 12:34:00

## 2021-04-09 RX ADMIN — ROCURONIUM BROMIDE 100 MG: 10 INJECTION, SOLUTION INTRAVENOUS at 07:07:00

## 2021-04-09 RX ADMIN — FAMOTIDINE 20 MG: 10 INJECTION, SOLUTION INTRAVENOUS at 12:34:00

## 2021-04-09 RX ADMIN — CEFAZOLIN SODIUM/WATER 2 G: 2 G/20 ML SYRINGE (ML) INTRAVENOUS at 16:01:00

## 2021-04-09 RX ADMIN — ACETAMINOPHEN 1000 MG: 10 INJECTION, SOLUTION INTRAVENOUS at 12:33:00

## 2021-04-09 RX ADMIN — NEOSTIGMINE METHYLSULFATE 3 MG: 1 INJECTION INTRAVENOUS at 12:34:00

## 2021-04-09 RX ADMIN — DEXMEDETOMIDINE HYDROCHLORIDE 0.5 MCG/KG/HR: 4 INJECTION, SOLUTION INTRAVENOUS at 12:45:00

## 2021-04-09 RX ADMIN — MIDAZOLAM HYDROCHLORIDE 2 MG: 1 INJECTION INTRAMUSCULAR; INTRAVENOUS at 07:02:00

## 2021-04-09 RX ADMIN — SODIUM CHLORIDE, SODIUM LACTATE, POTASSIUM CHLORIDE, CALCIUM CHLORIDE: 600; 310; 30; 20 INJECTION, SOLUTION INTRAVENOUS at 07:02:00

## 2021-04-09 RX ADMIN — PROTAMINE SULFATE 400 MG: 10 INJECTION, SOLUTION INTRAVENOUS at 09:58:00

## 2021-04-09 RX ADMIN — DEXTROSE AND SODIUM CHLORIDE 60 ML/HR: 5; .9 INJECTION, SOLUTION INTRAVENOUS at 22:00:00

## 2021-04-09 RX ADMIN — DOBUTAMINE HYDROCHLORIDE 2.5 MCG/KG/MIN: 100 INJECTION INTRAVENOUS at 09:18:00

## 2021-04-09 RX ADMIN — DEXTROSE AND SODIUM CHLORIDE 70 ML/HR: 5; .9 INJECTION, SOLUTION INTRAVENOUS at 21:00:00

## 2021-04-09 RX ADMIN — METOCLOPRAMIDE HYDROCHLORIDE 10 MG: 5 INJECTION INTRAMUSCULAR; INTRAVENOUS at 22:19:00

## 2021-04-09 RX ADMIN — CHLORHEXIDINE GLUCONATE 15 ML: 0.12 RINSE ORAL at 12:33:00

## 2021-04-09 RX ADMIN — DEXMEDETOMIDINE HYDROCHLORIDE 0.3 MCG/KG/HR: 4 INJECTION, SOLUTION INTRAVENOUS at 08:49:00

## 2021-04-09 NOTE — OPERATIVE REPORT
[facility] OPERATING ROOM  Operative Note     Acnakul Early Location: OR   [facility] [id number] MRN [id number]   Admission Date 4/8/2021 Operation Date 4/9/2021   Attending Physician Franca Hobbs MD Operating Physician Bob Carmona MD      Preoperative D endotracheal anesthesia was administered. The patient's chest, abdomen, and legs were prepped and draped in the usual sterile fashion. A time-out was conducted confirming patient, procedure, allergies, and antibiotics.   A midline incision was made overly PDA was completed with 7-0 Prolene. The vein was trimmed to an appropriate length proximally and the proximal end suspended with a 6-0 Prolene suture. The lateral wall was exposed and aside from a small proximal OM there were no identifiable branches.   Kenna Leary applied. Closing sponge, needle, and instrument counts were correct. Patient tolerated the procedure well and was transported back to the ICU in stable but critical condition after passing bleeding trials.     Ferny Cook MD  4/9/2021  10:23 AM        *Carrie Li

## 2021-04-09 NOTE — CONSULTS
[facility]    Report of Consultation    Brina Glass Patient Status:  Inpatient    1965 MRN [id number]   Location [facility] 2W/SW Attending Camryn Munguia MD   Hosp Day # 1 PCP Markus Wise MD     Date of Admission:   PRN  Fleet Enema (FLEET) 7-19 GM/118ML enema 133 mL, 1 enema, Rectal, Once PRN  Chlorhexidine Gluconate (PERIDEX) 0.12 % solution 15 mL, 15 mL, Mouth/Throat, [email or web address]  dextrose 5 % and 0.9 % NaCl infusion,  mL/hr, Intravenous, Continuous  [START mL, Mouth/Throat, BID  acetaminophen (OFIRMEV) infusion 1,000 mg, 1,000 mg, Intravenous, Q8H  acetaminophen (TYLENOL) tab 650 mg, 650 mg, Oral, Q4H PRN   Or  HYDROcodone-acetaminophen (NORCO) 5-325 MG per tab 1 tablet, 1 tablet, Oral, Q4H PRN   Or  HYDROco daily.  Isosorbide Mononitrate ER 60 MG Oral Tablet 24 Hr, Take 60 mg by mouth daily. AMLODIPINE BESYLATE 10 MG Oral Tab, TAKE 1 TABLET(10 MG) BY MOUTH DAILY  ASPIRIN 81 OR, Take 81 mg by mouth daily.   atorvastatin 40 MG Oral Tab, Take 1 tablet (40 mg tot 4/10/2021] Clopidogrel Bisulfate  75 mg Oral Daily   • [START ON 4/10/2021] aspirin  81 mg Oral Daily   • ceFAZolin  2 g Intravenous Q8H   • Metoclopramide HCl  10 mg Intravenous Q6H   • [START ON 4/10/2021] gabapentin  300 mg Oral Daily   • [START ON 4/10 4/08/2021 at 1:35 PM          US CAROTID DOPPLER BILAT - DIAG IMG (CPT=93880)    Result Date: 4/8/2021  CONCLUSION:  1. Mild atherosclerosis of carotid arteries. No hemodynamically significant stenosis. Velocities are in the normal range.  2. Antegrade pa

## 2021-04-09 NOTE — ANESTHESIA PROCEDURE NOTES
Airway  Date/Time: 4/9/2021 7:43 AM  Urgency: Elective    Airway not difficult    General Information and Staff    Patient location during procedure: OR  Anesthesiologist: Ami Anaya MD  Performed: anesthesiologist     Indications and Patient Conditio

## 2021-04-09 NOTE — CM/SW NOTE
CM received MDO for HH eval.  CM met with pt prior to surgery and sent [facility] referrals. [facility] able to accept and will follow up with pt when pt is stable to provide choice. Jesus Morse.  Klaudia Hough RN, BSN  Nurse   [phone]

## 2021-04-09 NOTE — ANESTHESIA POSTPROCEDURE EVALUATION
Patient: Darren Zurita    Procedure Summary     Date: 04/09/21 Room / Location: [address] [place] [address] OR 18 / [address] [place] [address] OR    Anesthesia Start: 9761 Anesthesia Stop: 4899    Procedure: Urgent coronary artery bypass graft x 3, Utilizing the left internal mammary artery t

## 2021-04-09 NOTE — ANESTHESIA PROCEDURE NOTES
Central Line  Performed by: James Christopher MD  Authorized by: James Christopher MD     General Information and Staff    Procedure Start:  4/9/2021 7:43 AM  Procedure End:  4/9/2021 7:43 AM  Anesthesiologist:  James Christopher MD  Performed by:   Anesthesiolog

## 2021-04-09 NOTE — ANESTHESIA PROCEDURE NOTES
Procedure Performed: MOISES      Start Time:  4/9/2021 10:56 AM       End Time:   4/9/2021 10:56 AM    Preanesthesia Checklist:  Patient identified, IV assessed, risks and benefits discussed, monitors and equipment assessed, procedure being performed at surge

## 2021-04-09 NOTE — ANESTHESIA PROCEDURE NOTES
Arterial Line  Performed by: Barbie Evans MD  Authorized by: Barbie Evans MD     General Information and Staff    Procedure Start:  4/9/2021 7:44 AM  Procedure End:  4/9/2021 7:44 AM  Anesthesiologist:  Barbie Evans MD  Performed By:  Monalisa Dsouza

## 2021-04-09 NOTE — PROGRESS NOTES
[facility]    Progress Note    Steven Harrison Patient Status:  Inpatient    1965 MRN [id number]   Location [facility] 2W/SW Attending Roseann Benitez MD   Hosp Day # 1 PCP Marce Granados MD     HPI/Subjective:   Subjective:  H 1:34 PM     Finalized by (CST): German Judge MD on 4/08/2021 at 1:35 PM          [address] (IBR=[id number])    Result Date: 4/8/2021  CONCLUSION:  1. Mild atherosclerosis of carotid arteries.   No hemodynamically significant sten

## 2021-04-09 NOTE — DIETARY NOTE
Nutrition Education Defer Note     Consult received for diet education per protocol. Pt is POD#0 s/p CABGx3. Pt is intubated and sedated. Education deferred until more appropriate for teaching. Patient/family visited.  Nutrition care discussed/handout

## 2021-04-09 NOTE — PLAN OF CARE
Received patient from [place] [address] intubated, ventilated and sedated. Patient required 1 dose 250mL of 5% albumin for marginal blood pressures and low CVP. Both improved after albumin.  Patient weaned from [address] [place] [address] and passed spontaneous breathing trial. Patient ex perfusion - ex.  Angina  - Evaluate fluid balance, assess for edema, trend weights  Outcome: Progressing  Goal: Absence of cardiac arrhythmias or at baseline  Description: INTERVENTIONS:  - Continuous cardiac monitoring, monitor vital signs, obtain 12 lead Modify environment to reduce risk of injury  - Provide assistive devices as appropriate  - Consider OT/PT consult to assist with strengthening/mobility  - Encourage toileting schedule  Outcome: Progressing     Problem: DISCHARGE PLANNING  Goal: Discharge t INTERVENTIONS:  - Maintain adequate hydration with IV or PO as ordered and tolerated  - Nasogastric tube to low intermittent suction as ordered  - Evaluate effectiveness of ordered antiemetic medications  - Provide nonpharmacologic comfort measures as appr ordered  - Monitor response to electrolyte replacements, including rhythm and repeat lab results as appropriate  - Fluid restriction as ordered  - Instruct patient on fluid and nutrition restrictions as appropriate  Outcome: Progressing  Goal: Hemodynamic patient  - Hold pressure on venipuncture sites to achieve adequate hemostasis  - Assess for signs and symptoms of internal bleeding  - Monitor lab trends  - Patient is to report abnormal signs of bleeding to staff  - Avoid use of toothpicks and dental flos

## 2021-04-09 NOTE — PLAN OF CARE
Problem: Patient Centered Care  Goal: Patient preferences are identified and integrated in the patient's plan of care  Description: Interventions:  - What would you like us to know as we care for you?   - Provide timely, complete, and accurate informatio Progressing  4/9/2021 0327 by Constanza Dyer, RN  Outcome: Progressing  Goal: Absence of cardiac arrhythmias or at baseline  Description: INTERVENTIONS:  - Continuous cardiac monitoring, monitor vital signs, obtain 12 lead EKG if indicated  - Evaluate effec

## 2021-04-09 NOTE — PAYOR COMM NOTE
--------------  ADMISSION REVIEW     Payor: [place] ELIAS/ALICE  Subscriber #:  [id number]  Authorization Number: [id number]    Admit date: 4/8/21  Admit time:  1:48 PM       Admitting Physician: Kyler Fisher DO  Attending Physician:  Saul Quigley MD of Systems:     Constitutional: Negative for activity change, appetite change, chills, diaphoresis, fatigue, fever and unexpected weight change. HENT: Negative. Eyes: Negative.     Respiratory: Negative for apnea, cough, choking, chest tightness, short deficit. Coordination and gait normal.   Skin: Skin is warm. Psychiatric: He has a normal mood and affect.          Results:     Lab Results   Component Value Date    WBC 7.1 04/08/2021    HGB 14.9 04/08/2021    HCT 42.9 04/08/2021    .0 04/08/2021 Date Action Dose Route User    4/9/2021 9293 Given 2 g Intravenous Blane Araujo MD      Dexmedetomidine HCl in NaCl [facility]) 400 MCG/100ML premix infusion     Date Action Dose Route User    4/9/2021 0849 New Bag 0.3 mcg/kg/hr × 95.3 kg Intravenous LORazepam (ATIVAN) tab 2 mg     Date Action Dose Route User    4/9/2021 0618 Given 2 mg Oral Chucho Crowley, RN      Metoclopramide HCl (REGLAN) tab 10 mg     Date Action Dose Route User    4/9/2021 0618 Given 10 mg Oral Kiya Diaz, MERCEDES      metoprolol 4/8/2021 1541 Given 40 mEq Oral Dorethea MERCEDES Doan      propofol (DIPRIVAN) injection     Date Action Dose Route User    4/9/2021 0707 Given 100 mg Intravenous Dali Martines MD      propofol (DIPRIVAN) infusion     Date Action Dose Route User    4/9/20 None     Implants:   Implant Name Type Inv.  Item Serial No.  Lot No. LRB No. Used Action   PLATE 8  [facility] [id number]   PLATE 8  [employer]OS[employer] [facility] [employer]   N/A 2 Implanted   PLATE  [employer] [facility] - [id number]   PL Mary Prier Optisite aortic cannula and the right atrial appendage with a dual stage venous cannula.   When adequate ACT was confirmed the bypass circuit was retrograde autologously primed and the patient placed on bypass and cooled to 32 °C.  An aortic between the LIMA and LAD was completed with 7-0 Prolene. The anastomosis was tested for flow with a Doppler probe on the distal LAD and there was excellent flow. A hot shot was administered and the cross-clamp removed.   The heart was de-aired, resuscitat

## 2021-04-10 ENCOUNTER — APPOINTMENT (OUTPATIENT)
Dept: GENERAL RADIOLOGY | Facility: HOSPITAL | Age: 56
DRG: 234 | End: 2021-04-10
Attending: CLINICAL NURSE SPECIALIST
Payer: COMMERCIAL

## 2021-04-10 PROCEDURE — 99233 SBSQ HOSP IP/OBS HIGH 50: CPT | Performed by: INTERNAL MEDICINE

## 2021-04-10 PROCEDURE — 71045 X-RAY EXAM CHEST 1 VIEW: CPT | Performed by: CLINICAL NURSE SPECIALIST

## 2021-04-10 RX ORDER — CYCLOBENZAPRINE HCL 5 MG
5 TABLET ORAL 2 TIMES DAILY PRN
Status: DISCONTINUED | OUTPATIENT
Start: 2021-04-10 | End: 2021-04-13

## 2021-04-10 RX ORDER — LISINOPRIL 2.5 MG/1
2.5 TABLET ORAL DAILY
Status: DISCONTINUED | OUTPATIENT
Start: 2021-04-10 | End: 2021-04-13

## 2021-04-10 RX ORDER — FUROSEMIDE 10 MG/ML
20 INJECTION INTRAMUSCULAR; INTRAVENOUS
Status: DISCONTINUED | OUTPATIENT
Start: 2021-04-10 | End: 2021-04-13

## 2021-04-10 RX ORDER — POTASSIUM CHLORIDE 20 MEQ/1
20 TABLET, EXTENDED RELEASE ORAL 2 TIMES DAILY
Status: DISCONTINUED | OUTPATIENT
Start: 2021-04-10 | End: 2021-04-13

## 2021-04-10 RX ORDER — ROSUVASTATIN CALCIUM 20 MG/1
40 TABLET, COATED ORAL NIGHTLY
Status: DISCONTINUED | OUTPATIENT
Start: 2021-04-10 | End: 2021-04-13

## 2021-04-10 RX ADMIN — HYDROMORPHONE HYDROCHLORIDE 0.4 MG: 1 INJECTION, SOLUTION INTRAMUSCULAR; INTRAVENOUS; SUBCUTANEOUS at 03:07:00

## 2021-04-10 RX ADMIN — GABAPENTIN 300 MG: 300 CAPSULE ORAL at 08:15:00

## 2021-04-10 RX ADMIN — ROSUVASTATIN CALCIUM 40 MG: 20 TABLET, COATED ORAL at 21:20:00

## 2021-04-10 RX ADMIN — CLOPIDOGREL BISULFATE 75 MG: 75 TABLET ORAL at 08:15:00

## 2021-04-10 RX ADMIN — POTASSIUM CHLORIDE 20 MEQ: 20 TABLET, EXTENDED RELEASE ORAL at 10:05:00

## 2021-04-10 RX ADMIN — ASCORBIC ACID 500 MG: 500 MG TABLET ORAL at 15:16:00

## 2021-04-10 RX ADMIN — FUROSEMIDE 20 MG: 10 INJECTION INTRAMUSCULAR; INTRAVENOUS at 18:59:00

## 2021-04-10 RX ADMIN — DOXEPIN HYDROCHLORIDE 1 TABLET: 50 CAPSULE ORAL at 08:16:00

## 2021-04-10 RX ADMIN — CHLORHEXIDINE GLUCONATE 15 ML: 0.12 RINSE ORAL at 21:21:00

## 2021-04-10 RX ADMIN — DOCUSATE SODIUM 100 MG: 100 CAPSULE, LIQUID FILLED ORAL at 21:21:00

## 2021-04-10 RX ADMIN — ASCORBIC ACID 500 MG: 500 MG TABLET ORAL at 21:21:00

## 2021-04-10 RX ADMIN — SENNOSIDES 8.6 MG: 8.6 MG TABLET ORAL at 08:15:00

## 2021-04-10 RX ADMIN — HYDROCODONE BITARTRATE AND ACETAMINOPHEN 1 TABLET: 5; 325 TABLET ORAL at 08:16:00

## 2021-04-10 RX ADMIN — ASPIRIN 81 MG: 81 TABLET ORAL at 08:15:00

## 2021-04-10 RX ADMIN — CEFAZOLIN SODIUM/WATER 2 G: 2 G/20 ML SYRINGE (ML) INTRAVENOUS at 23:29:00

## 2021-04-10 RX ADMIN — SENNOSIDES 8.6 MG: 8.6 MG TABLET ORAL at 21:21:00

## 2021-04-10 RX ADMIN — FAMOTIDINE 20 MG: 20 TABLET ORAL at 21:21:00

## 2021-04-10 RX ADMIN — Medication 25 MG: at 06:01:00

## 2021-04-10 RX ADMIN — METOCLOPRAMIDE HYDROCHLORIDE 10 MG: 5 INJECTION INTRAMUSCULAR; INTRAVENOUS at 10:05:00

## 2021-04-10 RX ADMIN — DOCUSATE SODIUM 100 MG: 100 CAPSULE, LIQUID FILLED ORAL at 08:16:00

## 2021-04-10 RX ADMIN — Medication 25 MG: at 19:00:00

## 2021-04-10 RX ADMIN — DEXTROSE AND SODIUM CHLORIDE 40 ML/HR: 5; .9 INJECTION, SOLUTION INTRAVENOUS at 01:00:00

## 2021-04-10 RX ADMIN — ACETAMINOPHEN 1000 MG: 10 INJECTION, SOLUTION INTRAVENOUS at 02:48:00

## 2021-04-10 RX ADMIN — METOCLOPRAMIDE HYDROCHLORIDE 10 MG: 5 INJECTION INTRAMUSCULAR; INTRAVENOUS at 15:16:00

## 2021-04-10 RX ADMIN — POTASSIUM CHLORIDE 20 MEQ: 20 TABLET, EXTENDED RELEASE ORAL at 21:21:00

## 2021-04-10 RX ADMIN — DEXTROSE AND SODIUM CHLORIDE 30 ML/HR: 5; .9 INJECTION, SOLUTION INTRAVENOUS at 02:00:00

## 2021-04-10 RX ADMIN — ONDANSETRON 4 MG: 2 INJECTION INTRAMUSCULAR; INTRAVENOUS at 00:37:00

## 2021-04-10 RX ADMIN — FUROSEMIDE 20 MG: 10 INJECTION INTRAMUSCULAR; INTRAVENOUS at 10:04:00

## 2021-04-10 RX ADMIN — CEFAZOLIN SODIUM/WATER 2 G: 2 G/20 ML SYRINGE (ML) INTRAVENOUS at 00:13:00

## 2021-04-10 RX ADMIN — FAMOTIDINE 20 MG: 10 INJECTION, SOLUTION INTRAVENOUS at 08:16:00

## 2021-04-10 RX ADMIN — HYDROMORPHONE HYDROCHLORIDE 0.4 MG: 1 INJECTION, SOLUTION INTRAMUSCULAR; INTRAVENOUS; SUBCUTANEOUS at 00:37:00

## 2021-04-10 RX ADMIN — METOCLOPRAMIDE HYDROCHLORIDE 10 MG: 5 INJECTION INTRAMUSCULAR; INTRAVENOUS at 23:30:00

## 2021-04-10 RX ADMIN — HYDROCODONE BITARTRATE AND ACETAMINOPHEN 1 TABLET: 5; 325 TABLET ORAL at 23:29:00

## 2021-04-10 RX ADMIN — METOCLOPRAMIDE HYDROCHLORIDE 10 MG: 5 INJECTION INTRAMUSCULAR; INTRAVENOUS at 04:33:00

## 2021-04-10 RX ADMIN — LISINOPRIL 2.5 MG: 2.5 TABLET ORAL at 10:04:00

## 2021-04-10 RX ADMIN — HYDROCODONE BITARTRATE AND ACETAMINOPHEN 1 TABLET: 5; 325 TABLET ORAL at 19:00:00

## 2021-04-10 RX ADMIN — ASCORBIC ACID 500 MG: 500 MG TABLET ORAL at 08:16:00

## 2021-04-10 RX ADMIN — HYDROCODONE BITARTRATE AND ACETAMINOPHEN 1 TABLET: 5; 325 TABLET ORAL at 13:01:00

## 2021-04-10 RX ADMIN — HYDROMORPHONE HYDROCHLORIDE 0.4 MG: 1 INJECTION, SOLUTION INTRAMUSCULAR; INTRAVENOUS; SUBCUTANEOUS at 05:16:00

## 2021-04-10 RX ADMIN — MELATONIN 3 MG: at 23:29:00

## 2021-04-10 RX ADMIN — CEFAZOLIN SODIUM/WATER 2 G: 2 G/20 ML SYRINGE (ML) INTRAVENOUS at 15:16:00

## 2021-04-10 RX ADMIN — CEFAZOLIN SODIUM/WATER 2 G: 2 G/20 ML SYRINGE (ML) INTRAVENOUS at 07:29:00

## 2021-04-10 RX ADMIN — CHLORHEXIDINE GLUCONATE 15 ML: 0.12 RINSE ORAL at 08:15:00

## 2021-04-10 RX ADMIN — POTASSIUM CHLORIDE 20 MEQ: 14.9 INJECTION INTRAVENOUS at 07:29:00

## 2021-04-10 NOTE — PROGRESS NOTES
[facility] - [facility]    Progress Note    Valencia Nash Patient Status:  Inpatient    1965 MRN [id number]   Location [facility] 2W/SW Attending Micha Pollard MD   Hosp Day # 2 PCP Makenzie Ellis MD     HPI/Subjective:     Constitution present. Pulmonary/Chest: Effort normal and breath sounds normal. No accessory muscle usage or stridor. No tachypnea and no bradypnea. He is not intubated. No respiratory distress. Expiration is no prolonged expiration. Air movement is not decreased.  No t surgery. Interval extubation and removal of feeding tube. 2.  Low lung volumes. Atelectasis or scar in the lung bases and trace bibasilar pleural effusions slightly more prominent on today's exam.  Other lines and tubes are grossly stable.    Dictated by involving native coronary artery of native heart  Cad s/p cath  shows: 90% heavily calcifed and ectatic mid to distal rca, 90% mid lad, 95% mid lcx,   S/P CABG x 3Urgent, Utilizing the left internal mammary artery to the lad; SVG to the OM; SVG to the PDA:

## 2021-04-10 NOTE — PROGRESS NOTES
[facility]    Progress Note    Steven Harrison Patient Status:  Inpatient    1965 MRN [id number]   Location [facility] 2W/SW Attending Roseann Benitez MD   Hosp Day # 2 PCP Marce Granados MD       Subjective:   Steven Harrison is a • DOBUTamine     • Nitroglycerin in D5W     • norepinephrine     • dexmedetomidine Stopped (04/09/21 1434)   • insulin regular 1 Units/hr (04/10/21 1000)   • sodium chloride 10 mL/hr (04/10/21 0700)   • norepinephrine Stopped (04/09/21 1033)       Duncan Seo 04/10/2021    TROP 0.01 02/02/2019    B12 383 11/11/2017       XR CHEST PA + LAT CHEST (CPT=71046)    Result Date: 4/8/2021  CONCLUSION:  1.  No acute cardiopulmonary disease    Dictated by (CST): Sumaya Hinkle MD on 4/08/2021 at 1:34 PM     Finalized consider myocardial injury or pericardial process ABNORMAL When compared with ECG of 04/09/2021 11:09:17 significant change have occurred Electronically signed on 04/10/2021 at 10:39 by Chad Landaverde MD    EKG 12-LEAD    Result Date: 4/9/2021  ECG Report  In

## 2021-04-10 NOTE — PROGRESS NOTES
Seen in fu post cath. Dw surgeon, pt and primary team and RN. Time spent > 35 min in coordination.      04/10/21  0800   BP: 131/74   Pulse: 94   Resp: 14   Temp: 98.7 °F (37.1 °C)       Intake/Output Summary (Last 24 hours) at 4/10/2021 0934  Last data

## 2021-04-10 NOTE — PLAN OF CARE
Problem: Patient Centered Care  Goal: Patient preferences are identified and integrated in the patient's plan of care  Description: Interventions:  - What would you like us to know as we care for you?  I don't like taking pain medications  - Provide timel Initiate emergency measures for life threatening arrhythmias  - Monitor electrolytes and administer replacement therapy as ordered  Outcome: Progressing     Problem: PAIN - ADULT  Goal: Verbalizes/displays adequate comfort level or patient's stated pain go discharge w/pt and caregiver  - Include patient/family/discharge partner in discharge planning  - Arrange for needed discharge resources and transportation as appropriate  - Identify discharge learning needs (meds, wound care, etc)  - Arrange for interpret balance  Outcome: Progressing  Goal: Maintains or returns to baseline bowel function  Description: INTERVENTIONS:  - Assess bowel function  - Maintain adequate hydration with IV or PO as ordered and tolerated  - Evaluate effectiveness of GI medications  - for signs and symptoms of volume excess or deficit  - Monitor intake, output and patient weight  - Monitor urine specific gravity, serum osmolarity and serum sodium as indicated or ordered  - Monitor response to interventions for patient's volume status, i blowing  Outcome: Progressing     Problem: MUSCULOSKELETAL - ADULT  Goal: Return mobility to safest level of function  Description: INTERVENTIONS:  - Assess patient stability and activity tolerance for standing, transferring and ambulating w/ or w/o assist

## 2021-04-10 NOTE — PLAN OF CARE
Problem: Patient Centered Care  Goal: Patient preferences are identified and integrated in the patient's plan of care  Description: Interventions:  - What would you like us to know as we care for you?pt get nauseas with pain medication  - Provide timely, indicated  - Evaluate effectiveness of antiarrhythmic and heart rate control medications as ordered  - Initiate emergency measures for life threatening arrhythmias  - Monitor electrolytes and administer replacement therapy as ordered  Outcome: Progressing other facility with appropriate resources  Description: INTERVENTIONS:  - Identify barriers to discharge w/pt and caregiver  - Include patient/family/discharge partner in discharge planning  - Arrange for needed discharge resources and transportation as ap Advance diet as tolerated, if ordered  - Obtain nutritional consult as needed  - Evaluate fluid balance  Outcome: Progressing  Goal: Maintains or returns to baseline bowel function  Description: INTERVENTIONS:  - Assess bowel function  - Maintain adequate and optimal renal function maintained  Description: INTERVENTIONS:  - Monitor labs and assess for signs and symptoms of volume excess or deficit  - Monitor intake, output and patient weight  - Monitor urine specific gravity, serum osmolarity and serum sodi electric shaver for shaving  - Use soft bristle tooth brush  - Limit straining and forceful nose blowing  Outcome: Progressing     Problem: MUSCULOSKELETAL - ADULT  Goal: Return mobility to safest level of function  Description: INTERVENTIONS:  - Assess pa

## 2021-04-10 NOTE — PROGRESS NOTES
Patient seen in follow up. Patient is post cabg. Doing well. DW APN with CV surgery. CCT 30 min. CO 7.7 L/m. EKG reviewed.    04/10/21  0800   BP: 131/74   Pulse: 94   Resp: 14   Temp: 98.7 °F (37.1 °C)       Intake/Output Summary (Last 24 hours) at 4 100 mg, Oral, BID  PEG 3350 (MIRALAX) powder packet 17 g, 17 g, Oral, Daily PRN  magnesium hydroxide (MILK OF MAGNESIA) 400 MG/5ML suspension 30 mL, 30 mL, Oral, Daily PRN  ondansetron HCl (ZOFRAN) injection 4 mg, 4 mg, Intravenous, Q6H PRN  famoTIDine (PE Units in sodium chloride 0.9% 100 mL infusion, 1-28 Units/hr, Intravenous, Continuous  glucose (DEX4) oral liquid 15 g, 15 g, Oral, Q15 Min PRN   Or  Glucose-Vitamin C (DEX-4) chewable tab 4 tablet, 4 tablet, Oral, Q15 Min PRN   Or  dextrose 50 % injection Date: 4/10/2021  CONCLUSION:  1. Post cardiac surgery. Interval extubation and removal of feeding tube. 2.  Low lung volumes.   Atelectasis or scar in the lung bases and trace bibasilar pleural effusions slightly more prominent on today's exam.  Other octaviano (CST): Jovanna Qureshi MD on 4/08/2021 at 7:26 PM           XR CHEST AP PORTABLE  (CPT=71045)     Result Date: 4/9/2021  CONCLUSION: 1. Endotracheal tube is in good position. 2. Mccurtain-Asiya catheter is in the main right pulmonary artery.  3.   [place]

## 2021-04-10 NOTE — HOME CARE LIAISON
Received referral via [facility]. Met with pt at bedside. Pt is unsure if he will need home health and would like someone to follow up closer to discharge. Brochure with choice list given to pt. TNL to follow up closer to discharge.

## 2021-04-11 PROCEDURE — 99233 SBSQ HOSP IP/OBS HIGH 50: CPT | Performed by: INTERNAL MEDICINE

## 2021-04-11 RX ORDER — POTASSIUM CHLORIDE 20 MEQ/1
40 TABLET, EXTENDED RELEASE ORAL ONCE
Status: COMPLETED | OUTPATIENT
Start: 2021-04-11 | End: 2021-04-11

## 2021-04-11 RX ORDER — METOPROLOL TARTRATE 50 MG/1
50 TABLET, FILM COATED ORAL
Status: DISCONTINUED | OUTPATIENT
Start: 2021-04-11 | End: 2021-04-13

## 2021-04-11 RX ADMIN — FAMOTIDINE 20 MG: 20 TABLET ORAL at 21:32:00

## 2021-04-11 RX ADMIN — CHLORHEXIDINE GLUCONATE 15 ML: 0.12 RINSE ORAL at 21:32:00

## 2021-04-11 RX ADMIN — ASCORBIC ACID 500 MG: 500 MG TABLET ORAL at 16:04:00

## 2021-04-11 RX ADMIN — ASCORBIC ACID 500 MG: 500 MG TABLET ORAL at 21:31:00

## 2021-04-11 RX ADMIN — CLOPIDOGREL BISULFATE 75 MG: 75 TABLET ORAL at 09:05:00

## 2021-04-11 RX ADMIN — SENNOSIDES 8.6 MG: 8.6 MG TABLET ORAL at 21:31:00

## 2021-04-11 RX ADMIN — SENNOSIDES 8.6 MG: 8.6 MG TABLET ORAL at 09:05:00

## 2021-04-11 RX ADMIN — POTASSIUM CHLORIDE 20 MEQ: 20 TABLET, EXTENDED RELEASE ORAL at 21:31:00

## 2021-04-11 RX ADMIN — HYDROCODONE BITARTRATE AND ACETAMINOPHEN 1 TABLET: 5; 325 TABLET ORAL at 21:31:00

## 2021-04-11 RX ADMIN — HYDROCODONE BITARTRATE AND ACETAMINOPHEN 1 TABLET: 5; 325 TABLET ORAL at 16:04:00

## 2021-04-11 RX ADMIN — FUROSEMIDE 20 MG: 10 INJECTION INTRAMUSCULAR; INTRAVENOUS at 09:05:00

## 2021-04-11 RX ADMIN — ASPIRIN 81 MG: 81 TABLET ORAL at 09:05:00

## 2021-04-11 RX ADMIN — ASCORBIC ACID 500 MG: 500 MG TABLET ORAL at 09:05:00

## 2021-04-11 RX ADMIN — METOCLOPRAMIDE HYDROCHLORIDE 10 MG: 5 INJECTION INTRAMUSCULAR; INTRAVENOUS at 16:04:00

## 2021-04-11 RX ADMIN — POTASSIUM CHLORIDE 20 MEQ: 20 TABLET, EXTENDED RELEASE ORAL at 09:05:00

## 2021-04-11 RX ADMIN — FAMOTIDINE 20 MG: 20 TABLET ORAL at 09:05:00

## 2021-04-11 RX ADMIN — Medication 25 MG: at 06:08:00

## 2021-04-11 RX ADMIN — ROSUVASTATIN CALCIUM 40 MG: 20 TABLET, COATED ORAL at 21:32:00

## 2021-04-11 RX ADMIN — HYDROCODONE BITARTRATE AND ACETAMINOPHEN 1 TABLET: 5; 325 TABLET ORAL at 09:05:00

## 2021-04-11 RX ADMIN — METOCLOPRAMIDE HYDROCHLORIDE 10 MG: 5 INJECTION INTRAMUSCULAR; INTRAVENOUS at 12:32:00

## 2021-04-11 RX ADMIN — POTASSIUM CHLORIDE 40 MEQ: 20 TABLET, EXTENDED RELEASE ORAL at 12:32:00

## 2021-04-11 RX ADMIN — CHLORHEXIDINE GLUCONATE 15 ML: 0.12 RINSE ORAL at 09:06:00

## 2021-04-11 RX ADMIN — METOCLOPRAMIDE HYDROCHLORIDE 10 MG: 5 INJECTION INTRAMUSCULAR; INTRAVENOUS at 03:45:00

## 2021-04-11 RX ADMIN — METOCLOPRAMIDE HYDROCHLORIDE 10 MG: 5 INJECTION INTRAMUSCULAR; INTRAVENOUS at 21:32:00

## 2021-04-11 RX ADMIN — DOCUSATE SODIUM 100 MG: 100 CAPSULE, LIQUID FILLED ORAL at 09:05:00

## 2021-04-11 RX ADMIN — HYDROCODONE BITARTRATE AND ACETAMINOPHEN 1 TABLET: 5; 325 TABLET ORAL at 03:54:00

## 2021-04-11 RX ADMIN — GABAPENTIN 300 MG: 300 CAPSULE ORAL at 09:05:00

## 2021-04-11 RX ADMIN — METOPROLOL TARTRATE 50 MG: 50 TABLET, FILM COATED ORAL at 18:14:00

## 2021-04-11 RX ADMIN — DOXEPIN HYDROCHLORIDE 1 TABLET: 50 CAPSULE ORAL at 09:05:00

## 2021-04-11 RX ADMIN — FUROSEMIDE 20 MG: 10 INJECTION INTRAMUSCULAR; INTRAVENOUS at 18:14:00

## 2021-04-11 RX ADMIN — DOCUSATE SODIUM 100 MG: 100 CAPSULE, LIQUID FILLED ORAL at 21:31:00

## 2021-04-11 RX ADMIN — LISINOPRIL 2.5 MG: 2.5 TABLET ORAL at 09:05:00

## 2021-04-11 NOTE — PLAN OF CARE
NSR to tachycardiac 100's when awake and feeling pain. + rub. Scant drainage from chest tube. Pain pill given q4hr with relieve. Patient requested sleepingin pill overnight, administered and patient able to get some sleep overnight. Good UO from [facility].  Kolby unsuccessful or patient reports new pain  - Anticipate increased pain with activity and pre-medicate as appropriate  Outcome: Progressing     Problem: RESPIRATORY - ADULT  Goal: Achieves optimal ventilation and oxygenation  Description: INTERVENTIONS:  - A

## 2021-04-11 NOTE — PROGRESS NOTES
Patient seen in follow up. No cp or sob. HR is better than yesterday. Incisional pain is better.     04/11/21  0600   BP: 117/67   Pulse: 96   Resp: 16   Temp:        Intake/Output Summary (Last 24 hours) at 4/11/2021 1235  Last data filed at 4/11/202 mcg/min, Intravenous, Continuous PRN  metoprolol Tartrate (LOPRESSOR) tab 25 mg, 25 mg, Oral, 2x Daily(Beta Blocker)  milrinone (PRIMACOR) 20mg in D5W 100 mL premix infusion, 0.125-0.25 mcg/kg/min, Intravenous, PRN  docusate sodium (COLACE) cap 100 mg, 100 Glucose-Vitamin C (DEX-4) chewable tab 4 tablet, 4 tablet, Oral, Q15 Min PRN   Or  dextrose 50 % injection 50 mL, 50 mL, Intravenous, Q15 Min PRN   Or  glucose (DEX4) oral liquid 30 g, 30 g, Oral, Q15 Min PRN   Or  Glucose-Vitamin C (DEX-4) chewable tab 8 4/8/2021  CONCLUSION:  1.  No acute cardiopulmonary disease    Dictated by (CST): Noel Ann MD on 4/08/2021 at 1:34 PM     Finalized by (CST): Noel Ann MD on 4/08/2021 at 1:35 PM           [address] (CPT=93880)

## 2021-04-11 NOTE — PROGRESS NOTES
[facility]    Progress Note    Palma Pacheco Patient Status:  Inpatient    1965 MRN [id number]   Location [facility] 2W/SW Attending Derrick Vizcaino MD   Hosp Day # 3 PCP Corinna Heller MD       Subjective:   Palma Pacheco is a noted  Pulmonary: Clear throughout  Cardiovascular: S1, S2 regular rate and rhythm; + rub noted  Abdominal: soft, non-tender;  + bowel sounds ; + flatus; 0 BM  Extremities:Left leg dressing C/D/I  Pedal Pulses: 2+ and symmetric  Skin: Skin color, texture, Report  Interpretation  -------------------------- Sinus Rhythm Voltage criteria for LVH met -Voltage criteria w/o ST/T abnormality may be normal.  -Lateral ST-elevation - consider myocardial injury or pericardial process ABNORMAL When compared with ECG of

## 2021-04-11 NOTE — PROGRESS NOTES
[facility]    Progress Note    Chris Gerardo Patient Status:  Inpatient    1965 MRN [id number]   Location [facility] 2W/SW Attending Allie Zazueta MD   Hosp Day # 3 PCP Kamlesh Virk MD     HPI/Subjective:     Constitution Effort normal. No accessory muscle usage or stridor. No tachypnea and no bradypnea. He is not intubated. No respiratory distress. Expiration is no prolonged expiration. Air movement is not decreased. No transmitted upper airway sounds.  He has decreased mj -------------------------- Sinus Rhythm Voltage criteria for LVH met -Voltage criteria w/o ST/T abnormality may be normal.  -Lateral ST-elevation - consider myocardial injury or pericardial process ABNORMAL When compared with ECG of 04/09/2021 11:09:17 sig

## 2021-04-11 NOTE — HOME CARE LIAISON
Received referral from [address] Patient provided with list of Celia Lima providers from [facility], patient choice is [address]. Financial interest disclosure provided to patient. Residential brochure provided with contact information.  All questions addre

## 2021-04-11 NOTE — CM/SW NOTE
Received update from Cheryl w/ [facility] - pt has been accepted and is agreeable to Residential [facility] at time of DC.     HH instructions added to pt's AVS.    PLAN: Home w/ parents and Residential [facility], pending med clear    SW/CM to remain available for support and/or di

## 2021-04-12 ENCOUNTER — APPOINTMENT (OUTPATIENT)
Dept: GENERAL RADIOLOGY | Facility: HOSPITAL | Age: 56
DRG: 234 | End: 2021-04-12
Attending: NURSE PRACTITIONER
Payer: COMMERCIAL

## 2021-04-12 PROCEDURE — 71046 X-RAY EXAM CHEST 2 VIEWS: CPT | Performed by: NURSE PRACTITIONER

## 2021-04-12 PROCEDURE — 99233 SBSQ HOSP IP/OBS HIGH 50: CPT | Performed by: INTERNAL MEDICINE

## 2021-04-12 RX ADMIN — FUROSEMIDE 20 MG: 10 INJECTION INTRAMUSCULAR; INTRAVENOUS at 17:42:00

## 2021-04-12 RX ADMIN — SENNOSIDES 8.6 MG: 8.6 MG TABLET ORAL at 11:46:00

## 2021-04-12 RX ADMIN — ACETAMINOPHEN 650 MG: 325 TABLET ORAL at 17:41:00

## 2021-04-12 RX ADMIN — CHLORHEXIDINE GLUCONATE 15 ML: 0.12 RINSE ORAL at 11:46:00

## 2021-04-12 RX ADMIN — HYDROCODONE BITARTRATE AND ACETAMINOPHEN 1 TABLET: 5; 325 TABLET ORAL at 03:07:00

## 2021-04-12 RX ADMIN — LISINOPRIL 2.5 MG: 2.5 TABLET ORAL at 11:44:00

## 2021-04-12 RX ADMIN — METOCLOPRAMIDE HYDROCHLORIDE 10 MG: 5 INJECTION INTRAMUSCULAR; INTRAVENOUS at 03:07:00

## 2021-04-12 RX ADMIN — ROSUVASTATIN CALCIUM 40 MG: 20 TABLET, COATED ORAL at 21:35:00

## 2021-04-12 RX ADMIN — METOCLOPRAMIDE HYDROCHLORIDE 10 MG: 5 INJECTION INTRAMUSCULAR; INTRAVENOUS at 11:44:00

## 2021-04-12 RX ADMIN — CHLORHEXIDINE GLUCONATE 15 ML: 0.12 RINSE ORAL at 21:35:00

## 2021-04-12 RX ADMIN — ASCORBIC ACID 500 MG: 500 MG TABLET ORAL at 21:35:00

## 2021-04-12 RX ADMIN — ONDANSETRON 4 MG: 2 INJECTION INTRAMUSCULAR; INTRAVENOUS at 10:39:00

## 2021-04-12 RX ADMIN — ASCORBIC ACID 500 MG: 500 MG TABLET ORAL at 17:41:00

## 2021-04-12 RX ADMIN — ASCORBIC ACID 500 MG: 500 MG TABLET ORAL at 11:45:00

## 2021-04-12 RX ADMIN — ASPIRIN 81 MG: 81 TABLET ORAL at 11:45:00

## 2021-04-12 RX ADMIN — DOCUSATE SODIUM 100 MG: 100 CAPSULE, LIQUID FILLED ORAL at 11:45:00

## 2021-04-12 RX ADMIN — POTASSIUM CHLORIDE 20 MEQ: 20 TABLET, EXTENDED RELEASE ORAL at 21:35:00

## 2021-04-12 RX ADMIN — METOPROLOL TARTRATE 50 MG: 50 TABLET, FILM COATED ORAL at 17:41:00

## 2021-04-12 RX ADMIN — FAMOTIDINE 20 MG: 20 TABLET ORAL at 21:35:00

## 2021-04-12 RX ADMIN — FUROSEMIDE 20 MG: 10 INJECTION INTRAMUSCULAR; INTRAVENOUS at 11:44:00

## 2021-04-12 RX ADMIN — HYDROCODONE BITARTRATE AND ACETAMINOPHEN 1 TABLET: 5; 325 TABLET ORAL at 21:46:00

## 2021-04-12 RX ADMIN — DOXEPIN HYDROCHLORIDE 1 TABLET: 50 CAPSULE ORAL at 11:45:00

## 2021-04-12 RX ADMIN — POLYETHYLENE GLYCOL 3350 17 G: 17 POWDER, FOR SOLUTION ORAL at 10:00:00

## 2021-04-12 RX ADMIN — FAMOTIDINE 20 MG: 10 INJECTION, SOLUTION INTRAVENOUS at 10:39:00

## 2021-04-12 RX ADMIN — CLOPIDOGREL BISULFATE 75 MG: 75 TABLET ORAL at 11:45:00

## 2021-04-12 RX ADMIN — METOPROLOL TARTRATE 50 MG: 50 TABLET, FILM COATED ORAL at 06:57:00

## 2021-04-12 RX ADMIN — POTASSIUM CHLORIDE 20 MEQ: 20 TABLET, EXTENDED RELEASE ORAL at 11:45:00

## 2021-04-12 NOTE — PAYOR COMM NOTE
--------------  CONTINUED STAY REVIEW    Payor: Mir ELIAS/ALICE  Subscriber #:  [id number]  Authorization Number: [id number]    Admit date: 4/8/21  Admit time:  1:48 PM    Admitting Physician: Radha Salazar DO  Attending Physician:  Robb Horan MD the supine position. Invasive monitoring lines were placed and general endotracheal anesthesia was administered. The patient's chest, abdomen, and legs were prepped and draped in the usual sterile fashion.   A time-out was conducted confirming patient, pr trimmed and spatulated, and an end-to-side anastomosis between the vein and PDA was completed with 7-0 Prolene. The vein was trimmed to an appropriate length proximally and the proximal end suspended with a 6-0 Prolene suture.   The lateral wall was expose running #1, 2-0, and 4-0 Vicryl sutures respectively. Sterile dressings were applied. Closing sponge, needle, and instrument counts were correct.   Patient tolerated the procedure well and was transported back to the ICU in stable but critical condition a RN      insulin detemir (LEVEMIR) 100 UNIT/ML flextouch 10 Units     Date Action Dose Route User    4/12/2021 1339 Given 10 Units Subcutaneous (Right Upper Arm) Gil Hogan RN      lisinopril tab 2.5 mg     Date Action Dose Route User    4/12/2021 11 Subjective:  Pt. Sitting in the chair. He is awake, alert & oriented x 3, calm and appropriate with no neuro deficits observed. He c/o sternal discomfort which is relieved with pain medications. He denies SOB. Placed on RA at this time.  No visitors a hallway   Scds and Lovenox prophylaxis DVT prevention   Expected post op volume overload improved.  On Lasix BID  Expected post op anemia: stable- consider Iron if Hb < 10.0  Discharge planning: pt. States he lives alone however plans to stay with is parent

## 2021-04-12 NOTE — PROGRESS NOTES
[facility]    Progress Note    Radha Montana Patient Status:  Inpatient    1965 MRN [id number]   Location [facility] 2W/SW Attending Jaaj Vee MD   Hosp Day # 4 PCP Farhan Khan MD     Subjective:  Pt.  Sitting in the CABG x 3 POD # 3  Encourage pulm toilet: IS- able to perform approx 1250cc w/IS. Reviewed CXR. Weaning oxygen.    Pain meds as needed  Increase activity- oob to chair and ambulate in hallway   Scds and Lovenox prophylaxis DVT prevention   Expected post op v

## 2021-04-12 NOTE — PLAN OF CARE
NSR on monitor. Lungs clear, heart rub occasional, improved since previous night assessment. Pain with movement, norco PO given with relieve. Patient able to get sleep overnight.      Problem: CARDIOVASCULAR - ADULT  Goal: Maintains optimal cardiac output a appropriate  Outcome: Progressing

## 2021-04-12 NOTE — PROGRESS NOTES
Patient seen in follow up. No cp or sob.  HR better   04/12/21  1200   BP: 128/84   Pulse: 103   Resp: 20   Temp: 97.5 °F (36.4 °C)       Intake/Output Summary (Last 24 hours) at 4/12/2021 1546  Last data filed at 4/12/2021 1258  Gross per 24 hour   I Intravenous, Continuous PRN  milrinone (PRIMACOR) 20mg in D5W 100 mL premix infusion, 0.125-0.25 mcg/kg/min, Intravenous, PRN  docusate sodium (COLACE) cap 100 mg, 100 mg, Oral, BID  PEG 3350 (MIRALAX) powder packet 17 g, 17 g, Oral, Daily PRN  magnesium h dextrose 50 % injection 50 mL, 50 mL, Intravenous, Q15 Min PRN   Or  glucose (DEX4) oral liquid 30 g, 30 g, Oral, Q15 Min PRN   Or  Glucose-Vitamin C (DEX-4) chewable tab 8 tablet, 8 tablet, Oral, Q15 Min PRN  norepinephrine (LEVOPHED) 4 mg/250 ml premix i significant stenosis. Velocities are in the normal range. 2. Antegrade patent vertebral arteries bilaterally.     Dictated by (CST): Livier Bergeron MD on 4/08/2021 at 7:21 PM     Finalized by (CST): Livier Bergeron MD on 4/08/2021 at 7:26 PM           [facility]

## 2021-04-12 NOTE — CARDIAC REHAB
Cardiac Rehab Phase I    Activity:   Chair: Yes   Ambulation: Yes   Assistive Device: Walker   Distance: 200 feet   Assistance needed: Minimal   Patient tolerated activity Well. Shower Date:  Tolerated Shower Activity .     Education:  Handouts provided a

## 2021-04-12 NOTE — PLAN OF CARE
Received patient in chair, eating breakfast. Chest tubes removed and pacer wires capped by CV APRN, indwelling urinary catheter and right internal jugular central line removed by this RN.  Patient reported improvement in pain level after chest tubes removed ex. Angina  - Evaluate fluid balance, assess for edema, trend weights  Outcome: Progressing  Goal: Absence of cardiac arrhythmias or at baseline  Description: INTERVENTIONS:  - Continuous cardiac monitoring, monitor vital signs, obtain 12 lead EKG if indic environment to reduce risk of injury  - Provide assistive devices as appropriate  - Consider OT/PT consult to assist with strengthening/mobility  - Encourage toileting schedule  Outcome: Progressing     Problem: DISCHARGE PLANNING  Goal: Discharge to home INTERVENTIONS:  - Maintain adequate hydration with IV or PO as ordered and tolerated  - Nasogastric tube to low intermittent suction as ordered  - Evaluate effectiveness of ordered antiemetic medications  - Provide nonpharmacologic comfort measures as appr ordered  - Monitor response to electrolyte replacements, including rhythm and repeat lab results as appropriate  - Fluid restriction as ordered  - Instruct patient on fluid and nutrition restrictions as appropriate  Outcome: Progressing  Goal: Hemodynamic patient  - Hold pressure on venipuncture sites to achieve adequate hemostasis  - Assess for signs and symptoms of internal bleeding  - Monitor lab trends  - Patient is to report abnormal signs of bleeding to staff  - Avoid use of toothpicks and dental flos

## 2021-04-12 NOTE — PROGRESS NOTES
[facility]    Progress Note    Josette Natarajanal Patient Status:  Inpatient    1965 MRN [id number]   Location [facility] 2W/SW Attending Hortensia Mendez MD   Hosp Day # 4 PCP Enid Abbott MD     HPI/Subjective:     Constitution Abdominal: Soft. Bowel sounds are normal. Musculoskeletal: Normal range of motion. Neurological: He is alert and oriented to person, place, and time.        Results:   Lab Results   Component Value Date    WBC 10.1 04/12/2021    HGB 12.8 (L) 04/12/2021

## 2021-04-13 VITALS
SYSTOLIC BLOOD PRESSURE: 122 MMHG | RESPIRATION RATE: 15 BRPM | HEART RATE: 104 BPM | DIASTOLIC BLOOD PRESSURE: 78 MMHG | TEMPERATURE: 98 F | HEIGHT: 68 IN | BODY MASS INDEX: 30.13 KG/M2 | WEIGHT: 198.81 LBS | OXYGEN SATURATION: 96 %

## 2021-04-13 PROCEDURE — 99239 HOSP IP/OBS DSCHRG MGMT >30: CPT | Performed by: INTERNAL MEDICINE

## 2021-04-13 RX ORDER — ASCORBIC ACID 500 MG
500 TABLET ORAL 3 TIMES DAILY
Qty: 90 TABLET | Refills: 0 | Status: SHIPPED | OUTPATIENT
Start: 2021-04-13 | End: 2021-05-11

## 2021-04-13 RX ORDER — DOXEPIN HYDROCHLORIDE 50 MG/1
1 CAPSULE ORAL DAILY
Qty: 30 TABLET | Refills: 0 | Status: SHIPPED | OUTPATIENT
Start: 2021-04-14

## 2021-04-13 RX ORDER — METOPROLOL TARTRATE 50 MG/1
50 TABLET, FILM COATED ORAL
Qty: 60 TABLET | Refills: 0 | Status: SHIPPED | OUTPATIENT
Start: 2021-04-13 | End: 2021-05-17

## 2021-04-13 RX ORDER — FAMOTIDINE 20 MG/1
20 TABLET ORAL 2 TIMES DAILY
Qty: 60 TABLET | Refills: 0 | Status: SHIPPED | OUTPATIENT
Start: 2021-04-13 | End: 2021-06-12

## 2021-04-13 RX ORDER — MELATONIN
325
Qty: 90 TABLET | Refills: 0 | Status: SHIPPED | OUTPATIENT
Start: 2021-04-13 | End: 2021-09-11

## 2021-04-13 RX ORDER — HYDROCODONE BITARTRATE AND ACETAMINOPHEN 5; 325 MG/1; MG/1
2 TABLET ORAL EVERY 4 HOURS PRN
Qty: 30 TABLET | Refills: 0 | Status: SHIPPED | OUTPATIENT
Start: 2021-04-13 | End: 2021-05-11

## 2021-04-13 RX ORDER — CLOPIDOGREL BISULFATE 75 MG/1
75 TABLET ORAL DAILY
Qty: 90 TABLET | Refills: 0 | Status: SHIPPED | OUTPATIENT
Start: 2021-04-14 | End: 2021-09-11

## 2021-04-13 RX ORDER — MELATONIN
325
Status: DISCONTINUED | OUTPATIENT
Start: 2021-04-13 | End: 2021-04-13

## 2021-04-13 RX ORDER — LISINOPRIL 2.5 MG/1
2.5 TABLET ORAL DAILY
Qty: 30 TABLET | Refills: 0 | Status: SHIPPED | OUTPATIENT
Start: 2021-04-14 | End: 2021-05-17

## 2021-04-13 RX ADMIN — METOPROLOL TARTRATE 50 MG: 50 TABLET, FILM COATED ORAL at 06:21:00

## 2021-04-13 RX ADMIN — CHLORHEXIDINE GLUCONATE 15 ML: 0.12 RINSE ORAL at 09:36:00

## 2021-04-13 RX ADMIN — MELATONIN 325 MG: at 12:45:00

## 2021-04-13 RX ADMIN — ASCORBIC ACID 500 MG: 500 MG TABLET ORAL at 09:22:00

## 2021-04-13 RX ADMIN — ASCORBIC ACID 500 MG: 500 MG TABLET ORAL at 14:26:00

## 2021-04-13 RX ADMIN — FAMOTIDINE 20 MG: 20 TABLET ORAL at 09:22:00

## 2021-04-13 RX ADMIN — ASPIRIN 81 MG: 81 TABLET ORAL at 09:22:00

## 2021-04-13 RX ADMIN — DOXEPIN HYDROCHLORIDE 1 TABLET: 50 CAPSULE ORAL at 09:22:00

## 2021-04-13 RX ADMIN — CLOPIDOGREL BISULFATE 75 MG: 75 TABLET ORAL at 09:22:00

## 2021-04-13 RX ADMIN — DOCUSATE SODIUM 100 MG: 100 CAPSULE, LIQUID FILLED ORAL at 09:22:00

## 2021-04-13 RX ADMIN — POTASSIUM CHLORIDE 20 MEQ: 20 TABLET, EXTENDED RELEASE ORAL at 09:22:00

## 2021-04-13 RX ADMIN — LISINOPRIL 2.5 MG: 2.5 TABLET ORAL at 10:38:00

## 2021-04-13 RX ADMIN — HYDROCODONE BITARTRATE AND ACETAMINOPHEN 1 TABLET: 5; 325 TABLET ORAL at 14:32:00

## 2021-04-13 NOTE — PROGRESS NOTES
[facility]    Progress Note    Luis Fernando Qureshi Patient Status:  Inpatient    1965 MRN [id number]   Location [facility] 2W/SW Attending Andrew Perez MD   Hosp Day # 5 PCP Edenilson Zamora MD     Subjective:  Pt.  Assisted to bed Stopped Lasix. Improved dizziness after bolus; suspect pt dehydrated. Expected post op anemia: stable- Iron for one month  Discharge planning: pt. States he lives alone however plans to stay with his parents once discharge. Home later today.  Will make F/

## 2021-04-13 NOTE — PROGRESS NOTES
Patient seen in follow up. No cp or sob.  HR better   04/13/21  1521   BP: 122/78   Pulse: 104   Resp: 15   Temp:        Intake/Output Summary (Last 24 hours) at 4/13/2021 1716  Last data filed at 4/13/2021 1336  Gross per 24 hour   Intake 960 ml   [place] sodium (COLACE) cap 100 mg, 100 mg, Oral, BID  PEG 3350 (MIRALAX) powder packet 17 g, 17 g, Oral, Daily PRN  magnesium hydroxide (MILK OF MAGNESIA) 400 MG/5ML suspension 30 mL, 30 mL, Oral, Daily PRN  ondansetron HCl (ZOFRAN) injection 4 mg, 4 mg, Intraven norepinephrine (LEVOPHED) 4 mg/250 ml premix infusion, 0.5-8 mcg/min, Intravenous, Continuous      No medications prior to admission. XR CHEST PA + LAT CHEST (CPT=71046)    Result Date: 4/12/2021  CONCLUSION:  1.  Negative postop chest.    Dictated by (C lad; SVG to the OM; SVG to the PDA:  Continue DAPT     Hypoxemia, due to atelectasis    PLAN:                   continue metoprolol to 50 mg po bid, continue DAPT and statins. Thank you for allowing me to participate in the care of your patient.  judy

## 2021-04-13 NOTE — PLAN OF CARE
Walking in the halls with standby assist. Steady on his feet with no assistive device. 3 BM overnight. Vitals WNL. Anticipating shower today & hopeful for discharge this afternoon.     Problem: CARDIOVASCULAR - ADULT  Goal: Maintains optimal cardiac output appropriate  Outcome: Progressing     Problem: RISK FOR INFECTION - ADULT  Goal: Absence of fever/infection during anticipated neutropenic period  Description: INTERVENTIONS  - Monitor WBC  - Administer growth factors as ordered  - Implement neutropenic gu prescribed range  Description: INTERVENTIONS:  - Monitor Blood Glucose as ordered  - Assess for signs and symptoms of hyperglycemia and hypoglycemia  - Administer ordered medications to maintain glucose within target range  - Assess barriers to adequate nu HEMATOLOGIC - ADULT  Goal: Maintains hematologic stability  Description: INTERVENTIONS  - Assess for signs and symptoms of bleeding or hemorrhage  - Monitor labs and vital signs for trends  - Administer supportive blood products/factors, fluids and medicat level and precautions during self-care  Outcome: Progressing

## 2021-04-13 NOTE — HOME CARE LIAISON
HH update - upon [facility] insurance check, per [facility] GAYATRI HANLEY pt is current with another [facility] agency. Will cancel [facility] referral with [facility]. Updated CM Madhu Peters who is aware.

## 2021-04-13 NOTE — CM/SW NOTE
04/13/21 1300   Discharge disposition   Expected discharge disposition Home-Health   Name of Facillity/Home Care/Hospice Residential   Discharge transportation Private car     Notified by V[employer] [facility] liaison, that [facility] insurance team was not able to get au

## 2021-04-13 NOTE — PROGRESS NOTES
[facility]    Progress Note    Flaco Fair Patient Status:  Inpatient    1965 MRN [id number]   Location [facility] 2W/SW Attending Saul Quigley MD   Hosp Day # 5 PCP Jhon Hogan MD     HPI/Subjective:     Constitution distress. He has no wheezes. He has no rales. Abdominal: Soft. Bowel sounds are normal. Musculoskeletal: Normal range of motion. Neurological: He is alert and oriented to person, place, and time.        Results:   Lab Results   Component Value Date    W chol.  On statin.      cxr nl,                 Zachary Betancourt MD  4/13/2021

## 2021-04-13 NOTE — DIETARY NOTE
NUTRITION EDUCATION NOTE    Received consult for nutrition education per cardiac rehab order set. POD#4 CABG. Appropriate education and handout(s) provided. See education section of Epic for specifics.     Lillian Casarez RDN, LDN  Clinical Nutrition  Ext 1

## 2021-04-13 NOTE — DISCHARGE SUMMARY
[facility] Patient Status:  Inpatient    1965 MRN [id number]   Location [facility] 2W/SW Attending Tiago Paredes MD   Hosp Day # 5 PCP Frnacisco Dupont MD     Date of Admission: 2021  Date of Discharge: 2021 obvious abnormality, atraumatic  Nose: Nares normal. Septum midline. Mucosa normal. No drainage or sinus tenderness. Throat: lips, mucosa, and tongue normal; teeth and gums normal  Back: symmetric, no curvature. ROM normal. No CVA tenderness.   Pulmonary: Diclofenac Sodium 75 MG Tbec  Commonly known as: VOLTAREN  What changed:   · when to take this  · reasons to take this      Take 1 tablet (75 mg total) by mouth 2 (two) times daily.    Quantity: 180 tablet  Refills: 1        CONTINUE taking these medicati total) by mouth 2 (two) times daily. ferrous sulfate 325 (65 FE) MG Oral Tab EC  Take 1 tablet (325 mg total) by mouth 3 (three) times daily with meals. multivitamin Oral Tab  Take 1 tablet by mouth daily.       Home Meds - Unchanged    Cholecalcifero

## 2021-04-13 NOTE — PAYOR COMM NOTE
--------------  CONTINUED STAY REVIEW    Payor: Zan ELIAS/ALICE  Subscriber #:  [id number]  Authorization Number: [id number]    Admit date: 4/8/21  Admit time:  1:48 PM    Admitting Physician: Araceli Welch DO  Attending Physician:  Sergio Soto MD well.  Scds and Lovenox prophylaxis DVT prevention   Expected post op volume overload resolved. Pt. Below pre op wgt. Stopped Lasix. Improved dizziness after bolus; suspect pt dehydrated.    Expected post op anemia: stable- Iron for one month  Discharge samia Route User    4/12/2021 1742 Given 20 mg Intravenous Sarita Davis, RN      HYDROcodone-acetaminophen [facility]) 5-325 MG per tab 1 tablet     Date Action Dose Route User    4/12/2021 2146 Given 1 tablet Oral Billy Quesada, RN      lisinopril tab 2.

## 2021-04-13 NOTE — PLAN OF CARE
Sue Stewart is A& O x4. He was showered today and walked multiple times. Discharged after reviewing AVS with RN. No further questions at this time. Wound care items sent home with pt. Pt left via personal vehicle w/ brother and in no s/s of distress.      Problem: monitoring, monitor vital signs, obtain 12 lead EKG if indicated  - Evaluate effectiveness of antiarrhythmic and heart rate control medications as ordered  - Initiate emergency measures for life threatening arrhythmias  - Monitor electrolytes and administe DISCHARGE PLANNING  Goal: Discharge to home or other facility with appropriate resources  Description: INTERVENTIONS:  - Identify barriers to discharge w/pt and caregiver  - Include patient/family/discharge partner in discharge planning  - Arrange for need INTERVENTIONS:  - Monitor Blood Glucose as ordered  - Assess for signs and symptoms of hyperglycemia and hypoglycemia  - Administer ordered medications to maintain glucose within target range  - Assess barriers to adequate nutritional intake and initiate n hematologic stability  Description: INTERVENTIONS  - Assess for signs and symptoms of bleeding or hemorrhage  - Monitor labs and vital signs for trends  - Administer supportive blood products/factors, fluids and medications as ordered and appropriate  - Ad self-care    Outcome: Completed

## 2021-04-14 NOTE — CARDIAC REHAB
Cardiac Rehab Phase I    Activity:   Chair: Yes   Ambulation: Yes   Assistive Device: No   Distance: 200 feet   Assistance needed: No   Patient tolerated activity: Well. Shower Date: 4/13/21 Tolerated Shower Activity Well.     Education:  Handouts provide

## 2021-04-15 ENCOUNTER — PATIENT OUTREACH (OUTPATIENT)
Dept: CASE MANAGEMENT | Age: 56
End: 2021-04-15

## 2021-04-15 ENCOUNTER — TELEPHONE (OUTPATIENT)
Dept: INTERNAL MEDICINE CLINIC | Facility: CLINIC | Age: 56
End: 2021-04-15

## 2021-04-15 DIAGNOSIS — Z95.1 S/P CABG X 3: ICD-10-CM

## 2021-04-15 DIAGNOSIS — Z01.818 PREOP TESTING: ICD-10-CM

## 2021-04-15 DIAGNOSIS — I25.10 CORONARY ARTERY DISEASE INVOLVING NATIVE CORONARY ARTERY OF NATIVE HEART WITHOUT ANGINA PECTORIS: ICD-10-CM

## 2021-04-15 DIAGNOSIS — Z02.9 ENCOUNTERS FOR UNSPECIFIED ADMINISTRATIVE PURPOSE: ICD-10-CM

## 2021-04-15 DIAGNOSIS — E78.00 HIGH CHOLESTEROL: ICD-10-CM

## 2021-04-15 PROCEDURE — 1111F DSCHRG MED/CURRENT MED MERGE: CPT

## 2021-04-15 NOTE — TELEPHONE ENCOUNTER
Spoke to patient appointment change to 4-22-21 for tcm     Patient is asking if it ok to have the 2nd dose of the covid vaccine he is scheduled for 4-19-21

## 2021-04-15 NOTE — PROGRESS NOTES
Initial Post Discharge Follow Up   Discharge Date: 4/13/21  Contact Date: 4/15/2021    Consent Verification:  Assessment Completed With: Patient  HIPAA Verified?   Yes    Discharge Dx:     Acute MI  CAD of native heart  S/P CABG x 3    Was TCC ordered: n any questions about your diagnoses? No  • Are you able to perform normal daily activities of living as you have prior to your hospital stay (dressing, bathing, ambulating to the bathroom, etc)?  yes  • (NCM) Was patient given a different diet per AVS? no yes  o If so, were these medication changes discussed with you prior to leaving the hospital? yes  • (NCM) If a new medication was prescribed:    o Was the new medication’s purpose & side effects reviewed?  yes  o Do you have any questions about your new me 2021 11:00 AM CDT [facility] Rehab Orientation with [address] [place] [address],[address] Floor Cardiopulmonary Rehab ([address])        Sep 11, 2021  9:30 AM CDT Physical - Established with MD Selina Rodriguez Cl appointment if schedule allowed:  NA    []  Advised patient to bring all medications and blood glucose meter/supplies if applicable.

## 2021-04-15 NOTE — TELEPHONE ENCOUNTER
Glennis Cabot, nurse from [facility], saw patient yesterday    States that patient declined physical therapy and just wants nursing visits    She is also calling to ask if provider will sign home orders          Per Glennis Cabot, ok to leave voicemail on her

## 2021-04-15 NOTE — TELEPHONE ENCOUNTER
Informed patient of Dr. Ivanna Ansari advice as per below. Patient verbalizes understanding and agrees.

## 2021-04-15 NOTE — TELEPHONE ENCOUNTER
Spoke to pt for TCM today. Patient is S/P urgent CABG x3 on 4/9/21  Patient has a TCM HFU scheduled on 5/4/2021 which is outside of the TCM timeframe. TCM/HFU appt recommended by 4/20/2021 as pt is at risk for readmission. Please advise.     BOOK BY DATE (

## 2021-04-19 ENCOUNTER — TELEPHONE (OUTPATIENT)
Dept: CASE MANAGEMENT | Facility: HOSPITAL | Age: 56
End: 2021-04-19

## 2021-04-22 ENCOUNTER — OFFICE VISIT (OUTPATIENT)
Dept: INTERNAL MEDICINE CLINIC | Facility: CLINIC | Age: 56
End: 2021-04-22
Payer: COMMERCIAL

## 2021-04-22 VITALS
BODY MASS INDEX: 30.92 KG/M2 | TEMPERATURE: 98 F | SYSTOLIC BLOOD PRESSURE: 118 MMHG | DIASTOLIC BLOOD PRESSURE: 70 MMHG | WEIGHT: 204 LBS | HEIGHT: 68 IN | OXYGEN SATURATION: 99 % | RESPIRATION RATE: 17 BRPM | HEART RATE: 70 BPM

## 2021-04-22 DIAGNOSIS — Z09 HOSPITAL DISCHARGE FOLLOW-UP: ICD-10-CM

## 2021-04-22 DIAGNOSIS — Z95.1 S/P CABG X 3: Primary | ICD-10-CM

## 2021-04-22 DIAGNOSIS — I10 ESSENTIAL HYPERTENSION: ICD-10-CM

## 2021-04-22 PROCEDURE — 3078F DIAST BP <80 MM HG: CPT | Performed by: INTERNAL MEDICINE

## 2021-04-22 PROCEDURE — 99214 OFFICE O/P EST MOD 30 MIN: CPT | Performed by: INTERNAL MEDICINE

## 2021-04-22 PROCEDURE — 3008F BODY MASS INDEX DOCD: CPT | Performed by: INTERNAL MEDICINE

## 2021-04-22 PROCEDURE — 3074F SYST BP LT 130 MM HG: CPT | Performed by: INTERNAL MEDICINE

## 2021-04-22 NOTE — PROGRESS NOTES
HPI/Subjective:     Patient ID: Faye Oakley is a 63year old male. HPI  Patient comes in for follow-up after he was in the hospital and had open heart surgery for advanced coronary disease.   Since leaving the hospital overall is doing okay his blood p spasms. 30 tablet 0   • Diclofenac Sodium 75 MG Oral Tab EC Take 1 tablet (75 mg total) by mouth 2 (two) times daily. (Patient taking differently: Take 75 mg by mouth daily as needed.  ) 180 tablet 1   • ASPIRIN 81 OR Take 81 mg by mouth daily.      • Isoso of motion. Cervical back: Normal range of motion and neck supple. Skin:     General: Skin is warm and dry. Neurological:      Mental Status: He is alert and oriented to person, place, and time.       Deep Tendon Reflexes: Reflexes are normal and sy

## 2021-04-27 ENCOUNTER — TELEPHONE (OUTPATIENT)
Dept: INTERNAL MEDICINE CLINIC | Facility: CLINIC | Age: 56
End: 2021-04-27

## 2021-04-27 NOTE — TELEPHONE ENCOUNTER
Zachary Charon calling to state she is discharging patient from home health nursing care. Patient will begin cardiac rehab outpatient next week.  No need to call back unless services need to be continued  Please reply to pool: EM TRIAGE SUPPORT

## 2021-05-10 ENCOUNTER — CARDPULM VISIT (OUTPATIENT)
Dept: CARDIAC REHAB | Facility: HOSPITAL | Age: 56
End: 2021-05-10
Attending: HOSPITALIST
Payer: COMMERCIAL

## 2021-05-11 ENCOUNTER — TELEPHONE (OUTPATIENT)
Dept: INTERNAL MEDICINE CLINIC | Facility: CLINIC | Age: 56
End: 2021-05-11

## 2021-05-11 ENCOUNTER — LAB ENCOUNTER (OUTPATIENT)
Dept: LAB | Age: 56
End: 2021-05-11
Attending: INTERNAL MEDICINE
Payer: COMMERCIAL

## 2021-05-11 DIAGNOSIS — D64.9 ANEMIA FOLLOWING SURGERY: ICD-10-CM

## 2021-05-11 DIAGNOSIS — D64.9 ANEMIA FOLLOWING SURGERY: Primary | ICD-10-CM

## 2021-05-11 PROCEDURE — 85025 COMPLETE CBC W/AUTO DIFF WBC: CPT

## 2021-05-11 PROCEDURE — 36415 COLL VENOUS BLD VENIPUNCTURE: CPT

## 2021-05-11 NOTE — TELEPHONE ENCOUNTER
Advised patient of Dr. Daja Hollins note. Patient verbalized understanding  Patient had question about whether he needed to take lisinopril. Advised patient that Dr. Trinh Hays wanted him to continue lisinopril.    Patient checked blood pressure while RN on phone a

## 2021-05-11 NOTE — TELEPHONE ENCOUNTER
Medications  Current Medications as of May 11, 2021  Generic Name Brand Name Strength Route/ Site Freq.    amLODIPine Besylate (Tab) NORVASC 10 MG  TAKE 1 TABLET(10 MG) BY MOUTH DAILY      Ascorbic Acid (Tab) VITAMIN C 500 MG Oral Take 1 tablet (500 mg tota

## 2021-05-11 NOTE — TELEPHONE ENCOUNTER
All, can stop vit C, also will need to retest CBc before we decide to stop the Iron,  will place an order

## 2021-05-11 NOTE — TELEPHONE ENCOUNTER
Patient calling trying to figure out what he should be taking since his discharge form the hospital  on 4/13/21. Patient also wants to know if he should continue Vit D3, iron, and Vitamin C.      Attempted to review discharge instructions from 4/13/21   P

## 2021-05-17 ENCOUNTER — CARDPULM VISIT (OUTPATIENT)
Dept: CARDIAC REHAB | Facility: HOSPITAL | Age: 56
End: 2021-05-17
Attending: HOSPITALIST
Payer: COMMERCIAL

## 2021-05-17 ENCOUNTER — TELEPHONE (OUTPATIENT)
Dept: INTERNAL MEDICINE CLINIC | Facility: CLINIC | Age: 56
End: 2021-05-17

## 2021-05-17 PROCEDURE — 93798 PHYS/QHP OP CAR RHAB W/ECG: CPT

## 2021-05-17 RX ORDER — LISINOPRIL 2.5 MG/1
2.5 TABLET ORAL DAILY
Qty: 30 TABLET | Refills: 0 | Status: SHIPPED | OUTPATIENT
Start: 2021-05-17 | End: 2021-05-17

## 2021-05-17 RX ORDER — METOPROLOL TARTRATE 50 MG/1
TABLET, FILM COATED ORAL
Qty: 180 TABLET | Refills: 0 | Status: SHIPPED | OUTPATIENT
Start: 2021-05-17 | End: 2021-08-12

## 2021-05-17 RX ORDER — LISINOPRIL 2.5 MG/1
TABLET ORAL
Qty: 90 TABLET | Refills: 0 | Status: SHIPPED | OUTPATIENT
Start: 2021-05-17 | End: 2021-08-12

## 2021-05-17 RX ORDER — METOPROLOL TARTRATE 50 MG/1
50 TABLET, FILM COATED ORAL
Qty: 60 TABLET | Refills: 0 | Status: SHIPPED | OUTPATIENT
Start: 2021-05-17 | End: 2021-05-17

## 2021-05-17 NOTE — TELEPHONE ENCOUNTER
Nasima Wan from cardiac rehab calling states patient is completely out of medication is requesting refill so he can stop on his way home and  rx,   He did miss a dose yesterday   Any questions please call Nasima Wan [phone]

## 2021-05-19 ENCOUNTER — CARDPULM VISIT (OUTPATIENT)
Dept: CARDIAC REHAB | Facility: HOSPITAL | Age: 56
End: 2021-05-19
Attending: HOSPITALIST
Payer: COMMERCIAL

## 2021-05-19 PROCEDURE — 93798 PHYS/QHP OP CAR RHAB W/ECG: CPT

## 2021-05-20 ENCOUNTER — ORDER TRANSCRIPTION (OUTPATIENT)
Dept: CARDIAC REHAB | Facility: HOSPITAL | Age: 56
End: 2021-05-20

## 2021-05-20 DIAGNOSIS — Z95.1 S/P CABG (CORONARY ARTERY BYPASS GRAFT): Primary | ICD-10-CM

## 2021-05-21 ENCOUNTER — CARDPULM VISIT (OUTPATIENT)
Dept: CARDIAC REHAB | Facility: HOSPITAL | Age: 56
End: 2021-05-21
Attending: HOSPITALIST
Payer: COMMERCIAL

## 2021-05-21 PROCEDURE — 93798 PHYS/QHP OP CAR RHAB W/ECG: CPT

## 2021-05-24 ENCOUNTER — CARDPULM VISIT (OUTPATIENT)
Dept: CARDIAC REHAB | Facility: HOSPITAL | Age: 56
End: 2021-05-24
Attending: HOSPITALIST
Payer: COMMERCIAL

## 2021-05-24 PROCEDURE — 93798 PHYS/QHP OP CAR RHAB W/ECG: CPT

## 2021-05-26 ENCOUNTER — CARDPULM VISIT (OUTPATIENT)
Dept: CARDIAC REHAB | Facility: HOSPITAL | Age: 56
End: 2021-05-26
Attending: HOSPITALIST
Payer: COMMERCIAL

## 2021-05-26 PROCEDURE — 93798 PHYS/QHP OP CAR RHAB W/ECG: CPT

## 2021-05-28 ENCOUNTER — OFFICE VISIT (OUTPATIENT)
Dept: INTERNAL MEDICINE CLINIC | Facility: CLINIC | Age: 56
End: 2021-05-28
Payer: COMMERCIAL

## 2021-05-28 ENCOUNTER — CARDPULM VISIT (OUTPATIENT)
Dept: CARDIAC REHAB | Facility: HOSPITAL | Age: 56
End: 2021-05-28
Attending: HOSPITALIST
Payer: COMMERCIAL

## 2021-05-28 VITALS
WEIGHT: 212 LBS | BODY MASS INDEX: 32.13 KG/M2 | SYSTOLIC BLOOD PRESSURE: 100 MMHG | OXYGEN SATURATION: 98 % | HEIGHT: 68 IN | RESPIRATION RATE: 18 BRPM | DIASTOLIC BLOOD PRESSURE: 62 MMHG | HEART RATE: 76 BPM | TEMPERATURE: 98 F

## 2021-05-28 DIAGNOSIS — H92.22 EAR BLEEDING, LEFT: Primary | ICD-10-CM

## 2021-05-28 DIAGNOSIS — R05.9 COUGH: ICD-10-CM

## 2021-05-28 PROCEDURE — 3078F DIAST BP <80 MM HG: CPT | Performed by: INTERNAL MEDICINE

## 2021-05-28 PROCEDURE — 99213 OFFICE O/P EST LOW 20 MIN: CPT | Performed by: INTERNAL MEDICINE

## 2021-05-28 PROCEDURE — 3008F BODY MASS INDEX DOCD: CPT | Performed by: INTERNAL MEDICINE

## 2021-05-28 PROCEDURE — 93798 PHYS/QHP OP CAR RHAB W/ECG: CPT

## 2021-05-28 PROCEDURE — 3074F SYST BP LT 130 MM HG: CPT | Performed by: INTERNAL MEDICINE

## 2021-05-28 NOTE — PROGRESS NOTES
HPI/Subjective:     Patient ID: Yessica Yip is a 61year old male. HPI  Comes in today with complaint of blood coming from the left ear he was using a Q-tip when he noticed blood no pain no discharge no loss of hearing.   Complains of some cough on and Tab Take 1 tablet by mouth daily. 30 tablet 0   • Cholecalciferol (VITAMIN D3) 25 MCG (1000 UT) Oral Cap Take 1 tablet by mouth daily. • atorvastatin 40 MG Oral Tab Take 1 tablet (40 mg total) by mouth nightly.  90 tablet 1   • AMLODIPINE BESYLATE 10 MG regular rhythm. Heart sounds: Normal heart sounds. Pulmonary:      Effort: Pulmonary effort is normal. No respiratory distress. Breath sounds: Normal breath sounds. No stridor. No wheezing, rhonchi or rales.    Abdominal:      General: Bowel dav

## 2021-06-02 ENCOUNTER — CARDPULM VISIT (OUTPATIENT)
Dept: CARDIAC REHAB | Facility: HOSPITAL | Age: 56
End: 2021-06-02
Attending: HOSPITALIST
Payer: COMMERCIAL

## 2021-06-02 PROCEDURE — 93798 PHYS/QHP OP CAR RHAB W/ECG: CPT

## 2021-06-02 NOTE — PAYOR COMM NOTE
--------------  DISCHARGE REVIEW    Payor: Trula Fleischer POS/ALICE  Subscriber #:  [id number]  Authorization Number: [id number]    Admit date: 4/8/21  Admit time:   1:48 PM  Discharge Date: 4/13/2021  4:10 PM     Admitting Physician: DO Jonathan Santos heavily calcifed and ectatic mid to distal rca, 90% mid lad, 95% mid lcx,      S/P CABG x 3Urgent, Utilizing the left internal mammary artery to the lad; SVG to the OM; SVG to the PDA:     Cardiovascular sx. and cards. following pt. HH upon discharge.  Pt. Tabs  Commonly known as: PEPCID      Take 1 tablet (20 mg total) by mouth 2 (two) times daily. Quantity: 60 tablet  Refills: 0     ferrous sulfate 325 (65 FE) MG Tbec      Take 1 tablet (325 mg total) by mouth 3 (three) times daily with meals.    Quantity [id number]   · ascorbic acid 500 MG Tabs  · Clopidogrel Bisulfate 75 MG Tabs  · famoTIDine 20 MG Tabs  · ferrous sulfate 325 (65 FE) MG Tbec  · HYDROcodone-acetaminophen 5-325 MG Tabs  · multivitamin Tabs         Discharge Plan:  Discharge Condition: [facility] [address] [place] [address],[address] [place] [address]  [phone]    On 4/28/2021  Follow up appointment at 11 AM      Vital signs:  Temp:  [97.4 °F (36.3 °C)-98.2 °F (36.8 °C)] 97.4 °F (36.3 °C)  Pulse:  [] 115  Resp:  [15-28] 28  BP: ()/(69-84) 114/76    ---------------- (DULCOLAX) rectal suppository 10 mg, 10 mg, Rectal, Daily PRN  Fleet Enema (FLEET) 7-19 GM/118ML enema 133 mL, 1 enema, Rectal, Once PRN  melatonin tab 3 mg, 3 mg, Oral, Nightly PRN  DOBUTamine in D5W (DOBUTREX) 500 mg/250 ml infusion, 2.5-10 mcg/kg/min (D Or  HYDROmorphone HCl (DILAUDID) 1 MG/ML injection 1.2 mg, 1.2 mg, Intravenous, Q2H PRN  Clopidogrel Bisulfate (PLAVIX) tab 75 mg, 75 mg, Oral, Daily  aspirin EC tab 81 mg, 81 mg, Oral, Daily  Metoclopramide HCl (REGLAN) injection 10 mg, 10 mg, Intravenous CO2 32.0 04/12/2021      04/12/2021     CA 8.2 04/12/2021      XR CHEST PA + LAT CHEST (XWR=89068)     Result Date: 4/8/2021  CONCLUSION:  1. No acute cardiopulmonary disease    Dictated by (CST): Elsy Mcrae MD on 4/08/2021 at 1:34 PM     F Cardiology & Advanced Heart Failure, Cardiac Transplant and Assisted Devices  [facility]  Pager: [phone]  Tel: [phone]         Electronically signed by Viraj Li MD at 4/12/2021  3:49 PM  4/11  Patient seen in follow up.  New Dai ml infusion, 2.5-10 mcg/kg/min (Dosing Weight), Intravenous, Continuous PRN  nitroGLYCERIN infusion 50mg in D5W 250ml, 5-300 mcg/min, Intravenous, Continuous PRN  norepinephrine (LEVOPHED) 4 mg/250 ml premix infusion, 0.5-30 mcg/min, Intravenous, Continuou Daily  aspirin EC tab 81 mg, 81 mg, Oral, Daily  Metoclopramide HCl (REGLAN) injection 10 mg, 10 mg, Intravenous, Q6H  Senna (SENOKOT) tab 8.6 mg, 8.6 mg, Oral, BID  glucose (DEX4) oral liquid 15 g, 15 g, Oral, Q15 Min PRN   Or  Glucose-Vitamin C (DEX-4) c CREATSERUM 0.74 04/11/2021     BUN 18 04/11/2021      04/11/2021     K 3.7 04/11/2021      04/11/2021     CO2 32.0 04/11/2021      04/11/2021     CA 8.3 04/11/2021     MG 2.3 04/11/2021      XR CHEST PA + LAT CHEST (CPT=71046)     Result [facility]  [address] [facility]  Phone: [phone]  [email or web address]         Electronically signed by Liliana Knott DO at 4/11/2021 12:38 PM    4/10  Subjective:   Carlito Mancuso is a(n) 63year old male Si (04/10/21 0700)   • DOBUTamine     • Nitroglycerin in D5W     • norepinephrine     • dexmedetomidine Stopped (04/09/21 1434)   • insulin regular 1 Units/hr (04/10/21 1000)   • sodium chloride 10 mL/hr (04/10/21 0700)   • norepinephrine Stopped (04/09/21 10 0.8 02/14/2017     ESRML 4 09/04/2019     MG 2.4 04/10/2021     TROP 0.01 02/02/2019     B12 383 11/11/2017         XR CHEST PA + LAT CHEST (LCF=92332)     Result Date: 4/8/2021  CONCLUSION:  1.  No acute cardiopulmonary disease    Dictated by (CST): Nara Neigh for LVH met -Voltage criteria w/o ST/T abnormality may be normal.  -Lateral ST-elevation - consider myocardial injury or pericardial process ABNORMAL When compared with ECG of 04/09/2021 11:09:17 significant change have occurred Electronically signed on 04 post-bypass MOISES.     Anesthesia: General General     Complications: None     Implants:   Implant Name Type Inv.  Item Serial No.  Lot No. LRB No. Used Action   PLATE 8  [email or web address] [id number]   PLATE 8  [email or web address] BIOM

## 2021-06-04 ENCOUNTER — APPOINTMENT (OUTPATIENT)
Dept: CARDIAC REHAB | Facility: HOSPITAL | Age: 56
End: 2021-06-04
Attending: HOSPITALIST
Payer: COMMERCIAL

## 2021-06-04 PROCEDURE — 93798 PHYS/QHP OP CAR RHAB W/ECG: CPT

## 2021-06-07 ENCOUNTER — CARDPULM VISIT (OUTPATIENT)
Dept: CARDIAC REHAB | Facility: HOSPITAL | Age: 56
End: 2021-06-07
Attending: HOSPITALIST
Payer: COMMERCIAL

## 2021-06-07 PROCEDURE — 93798 PHYS/QHP OP CAR RHAB W/ECG: CPT

## 2021-06-09 ENCOUNTER — CARDPULM VISIT (OUTPATIENT)
Dept: CARDIAC REHAB | Facility: HOSPITAL | Age: 56
End: 2021-06-09
Attending: HOSPITALIST
Payer: COMMERCIAL

## 2021-06-09 PROCEDURE — 93798 PHYS/QHP OP CAR RHAB W/ECG: CPT

## 2021-06-11 ENCOUNTER — CARDPULM VISIT (OUTPATIENT)
Dept: CARDIAC REHAB | Facility: HOSPITAL | Age: 56
End: 2021-06-11
Attending: HOSPITALIST
Payer: COMMERCIAL

## 2021-06-11 PROCEDURE — 93798 PHYS/QHP OP CAR RHAB W/ECG: CPT

## 2021-06-12 ENCOUNTER — OFFICE VISIT (OUTPATIENT)
Dept: INTERNAL MEDICINE CLINIC | Facility: CLINIC | Age: 56
End: 2021-06-12
Payer: COMMERCIAL

## 2021-06-12 VITALS
BODY MASS INDEX: 32.13 KG/M2 | HEIGHT: 68 IN | HEART RATE: 70 BPM | DIASTOLIC BLOOD PRESSURE: 64 MMHG | SYSTOLIC BLOOD PRESSURE: 116 MMHG | WEIGHT: 212 LBS | OXYGEN SATURATION: 99 % | TEMPERATURE: 98 F | RESPIRATION RATE: 17 BRPM

## 2021-06-12 DIAGNOSIS — Z00.00 ANNUAL PHYSICAL EXAM: Primary | ICD-10-CM

## 2021-06-12 DIAGNOSIS — Z95.1 S/P CABG X 3: ICD-10-CM

## 2021-06-12 DIAGNOSIS — R30.0 DYSURIA: ICD-10-CM

## 2021-06-12 DIAGNOSIS — I10 ESSENTIAL HYPERTENSION: ICD-10-CM

## 2021-06-12 DIAGNOSIS — Z00.00 ENCOUNTER FOR PREVENTIVE CARE: ICD-10-CM

## 2021-06-12 DIAGNOSIS — M47.812 OSTEOARTHRITIS OF CERVICAL SPINE, UNSPECIFIED SPINAL OSTEOARTHRITIS COMPLICATION STATUS: ICD-10-CM

## 2021-06-12 DIAGNOSIS — E78.00 HIGH CHOLESTEROL: ICD-10-CM

## 2021-06-12 DIAGNOSIS — I25.10 CORONARY ARTERY DISEASE INVOLVING NATIVE CORONARY ARTERY OF NATIVE HEART WITHOUT ANGINA PECTORIS: ICD-10-CM

## 2021-06-12 DIAGNOSIS — R35.1 NOCTURIA: ICD-10-CM

## 2021-06-12 PROCEDURE — 36415 COLL VENOUS BLD VENIPUNCTURE: CPT | Performed by: INTERNAL MEDICINE

## 2021-06-12 PROCEDURE — 90732 PPSV23 VACC 2 YRS+ SUBQ/IM: CPT | Performed by: INTERNAL MEDICINE

## 2021-06-12 PROCEDURE — 3008F BODY MASS INDEX DOCD: CPT | Performed by: INTERNAL MEDICINE

## 2021-06-12 PROCEDURE — 99396 PREV VISIT EST AGE 40-64: CPT | Performed by: INTERNAL MEDICINE

## 2021-06-12 PROCEDURE — 3078F DIAST BP <80 MM HG: CPT | Performed by: INTERNAL MEDICINE

## 2021-06-12 PROCEDURE — 3074F SYST BP LT 130 MM HG: CPT | Performed by: INTERNAL MEDICINE

## 2021-06-12 PROCEDURE — 90471 IMMUNIZATION ADMIN: CPT | Performed by: INTERNAL MEDICINE

## 2021-06-12 PROCEDURE — 81003 URINALYSIS AUTO W/O SCOPE: CPT | Performed by: INTERNAL MEDICINE

## 2021-06-12 RX ORDER — FAMOTIDINE 20 MG/1
20 TABLET ORAL 2 TIMES DAILY
Qty: 180 TABLET | Refills: 1 | Status: SHIPPED | OUTPATIENT
Start: 2021-06-12 | End: 2021-12-04

## 2021-06-12 NOTE — PROGRESS NOTES
HPI/Subjective:     Patient ID: Sherron Nevarez is a 61year old male.     HPI  Patient comes in today for annual physical overall doing okay improving overall he is complaining today of some discomfort between the rectum and testes thinks this could be his p ASPIRIN 81 OR Take 81 mg by mouth daily.        Allergies:No Known Allergies    Past Medical History:   Diagnosis Date   • Coronary atherosclerosis    • Essential hypertension 2/14/2017   • High blood pressure    • High cholesterol       No past surgical hi encounter diagnosis) exam is okay she has had most labs  Dysuria urine does not look like this infection will send for culture could be prostate related if any worsening symptoms let us know urine culture PSA will refer to urology  Essential hypertension c

## 2021-06-14 ENCOUNTER — CARDPULM VISIT (OUTPATIENT)
Dept: CARDIAC REHAB | Facility: HOSPITAL | Age: 56
End: 2021-06-14
Attending: HOSPITALIST
Payer: COMMERCIAL

## 2021-06-14 PROCEDURE — 93798 PHYS/QHP OP CAR RHAB W/ECG: CPT

## 2021-06-16 ENCOUNTER — CARDPULM VISIT (OUTPATIENT)
Dept: CARDIAC REHAB | Facility: HOSPITAL | Age: 56
End: 2021-06-16
Attending: HOSPITALIST
Payer: COMMERCIAL

## 2021-06-16 PROCEDURE — 93798 PHYS/QHP OP CAR RHAB W/ECG: CPT

## 2021-06-18 ENCOUNTER — CARDPULM VISIT (OUTPATIENT)
Dept: CARDIAC REHAB | Facility: HOSPITAL | Age: 56
End: 2021-06-18
Attending: HOSPITALIST
Payer: COMMERCIAL

## 2021-06-18 PROCEDURE — 93798 PHYS/QHP OP CAR RHAB W/ECG: CPT

## 2021-06-20 RX ORDER — AMLODIPINE BESYLATE 10 MG/1
TABLET ORAL
Qty: 90 TABLET | Refills: 0 | Status: SHIPPED | OUTPATIENT
Start: 2021-06-20 | End: 2021-09-15

## 2021-06-21 ENCOUNTER — CARDPULM VISIT (OUTPATIENT)
Dept: CARDIAC REHAB | Facility: HOSPITAL | Age: 56
End: 2021-06-21
Attending: HOSPITALIST
Payer: COMMERCIAL

## 2021-06-21 PROCEDURE — 93798 PHYS/QHP OP CAR RHAB W/ECG: CPT

## 2021-06-23 ENCOUNTER — CARDPULM VISIT (OUTPATIENT)
Dept: CARDIAC REHAB | Facility: HOSPITAL | Age: 56
End: 2021-06-23
Attending: HOSPITALIST
Payer: COMMERCIAL

## 2021-06-23 PROCEDURE — 93798 PHYS/QHP OP CAR RHAB W/ECG: CPT

## 2021-06-25 ENCOUNTER — CARDPULM VISIT (OUTPATIENT)
Dept: CARDIAC REHAB | Facility: HOSPITAL | Age: 56
End: 2021-06-25
Attending: HOSPITALIST
Payer: COMMERCIAL

## 2021-06-25 ENCOUNTER — TELEPHONE (OUTPATIENT)
Dept: INTERNAL MEDICINE CLINIC | Facility: CLINIC | Age: 56
End: 2021-06-25

## 2021-06-25 PROCEDURE — 93798 PHYS/QHP OP CAR RHAB W/ECG: CPT

## 2021-06-28 ENCOUNTER — APPOINTMENT (OUTPATIENT)
Dept: CARDIAC REHAB | Facility: HOSPITAL | Age: 56
End: 2021-06-28
Attending: HOSPITALIST
Payer: COMMERCIAL

## 2021-06-28 PROCEDURE — 93798 PHYS/QHP OP CAR RHAB W/ECG: CPT

## 2021-06-30 ENCOUNTER — CARDPULM VISIT (OUTPATIENT)
Dept: CARDIAC REHAB | Facility: HOSPITAL | Age: 56
End: 2021-06-30
Attending: HOSPITALIST
Payer: COMMERCIAL

## 2021-06-30 PROCEDURE — 93798 PHYS/QHP OP CAR RHAB W/ECG: CPT

## 2021-07-02 ENCOUNTER — CARDPULM VISIT (OUTPATIENT)
Dept: CARDIAC REHAB | Facility: HOSPITAL | Age: 56
End: 2021-07-02
Attending: HOSPITALIST
Payer: COMMERCIAL

## 2021-07-02 PROCEDURE — 93798 PHYS/QHP OP CAR RHAB W/ECG: CPT

## 2021-07-07 ENCOUNTER — CARDPULM VISIT (OUTPATIENT)
Dept: CARDIAC REHAB | Facility: HOSPITAL | Age: 56
End: 2021-07-07
Attending: HOSPITALIST
Payer: COMMERCIAL

## 2021-07-07 PROCEDURE — 93798 PHYS/QHP OP CAR RHAB W/ECG: CPT

## 2021-07-09 ENCOUNTER — CARDPULM VISIT (OUTPATIENT)
Dept: CARDIAC REHAB | Facility: HOSPITAL | Age: 56
End: 2021-07-09
Attending: HOSPITALIST
Payer: COMMERCIAL

## 2021-07-09 PROCEDURE — 93798 PHYS/QHP OP CAR RHAB W/ECG: CPT

## 2021-07-12 ENCOUNTER — CARDPULM VISIT (OUTPATIENT)
Dept: CARDIAC REHAB | Facility: HOSPITAL | Age: 56
End: 2021-07-12
Attending: HOSPITALIST
Payer: COMMERCIAL

## 2021-07-12 PROCEDURE — 93798 PHYS/QHP OP CAR RHAB W/ECG: CPT

## 2021-07-14 ENCOUNTER — CARDPULM VISIT (OUTPATIENT)
Dept: CARDIAC REHAB | Facility: HOSPITAL | Age: 56
End: 2021-07-14
Attending: HOSPITALIST
Payer: COMMERCIAL

## 2021-07-14 PROCEDURE — 93798 PHYS/QHP OP CAR RHAB W/ECG: CPT

## 2021-08-04 ENCOUNTER — APPOINTMENT (OUTPATIENT)
Dept: CARDIAC REHAB | Facility: HOSPITAL | Age: 56
End: 2021-08-04
Attending: HOSPITALIST
Payer: COMMERCIAL

## 2021-08-06 ENCOUNTER — APPOINTMENT (OUTPATIENT)
Dept: CARDIAC REHAB | Facility: HOSPITAL | Age: 56
End: 2021-08-06
Attending: HOSPITALIST
Payer: COMMERCIAL

## 2021-08-09 ENCOUNTER — APPOINTMENT (OUTPATIENT)
Dept: CARDIAC REHAB | Facility: HOSPITAL | Age: 56
End: 2021-08-09
Attending: HOSPITALIST
Payer: COMMERCIAL

## 2021-08-11 ENCOUNTER — APPOINTMENT (OUTPATIENT)
Dept: CARDIAC REHAB | Facility: HOSPITAL | Age: 56
End: 2021-08-11
Attending: HOSPITALIST
Payer: COMMERCIAL

## 2021-08-12 RX ORDER — LISINOPRIL 2.5 MG/1
2.5 TABLET ORAL DAILY
Qty: 90 TABLET | Refills: 1 | Status: SHIPPED | OUTPATIENT
Start: 2021-08-12 | End: 2022-02-04

## 2021-08-12 RX ORDER — METOPROLOL TARTRATE 50 MG/1
50 TABLET, FILM COATED ORAL 2 TIMES DAILY
Qty: 180 TABLET | Refills: 1 | Status: SHIPPED | OUTPATIENT
Start: 2021-08-12 | End: 2022-02-04

## 2021-08-12 NOTE — TELEPHONE ENCOUNTER
Refilled per [facility] protocol.   Requested Prescriptions   Pending Prescriptions Disp Refills    METOPROLOL TARTRATE 50 MG Oral Tab [Pharmacy Med Name: METOPROLOL TARTRATE 50MG TABLETS] 180 tablet 0     Sig: TAKE 1 TABLET BY MOUTH TWICE DAILY( BETA

## 2021-08-13 ENCOUNTER — APPOINTMENT (OUTPATIENT)
Dept: CARDIAC REHAB | Facility: HOSPITAL | Age: 56
End: 2021-08-13
Attending: HOSPITALIST
Payer: COMMERCIAL

## 2021-08-16 ENCOUNTER — APPOINTMENT (OUTPATIENT)
Dept: CARDIAC REHAB | Facility: HOSPITAL | Age: 56
End: 2021-08-16
Attending: HOSPITALIST
Payer: COMMERCIAL

## 2021-09-11 ENCOUNTER — OFFICE VISIT (OUTPATIENT)
Dept: INTERNAL MEDICINE CLINIC | Facility: CLINIC | Age: 56
End: 2021-09-11
Payer: COMMERCIAL

## 2021-09-11 VITALS
RESPIRATION RATE: 18 BRPM | BODY MASS INDEX: 33.19 KG/M2 | SYSTOLIC BLOOD PRESSURE: 112 MMHG | TEMPERATURE: 98 F | DIASTOLIC BLOOD PRESSURE: 68 MMHG | OXYGEN SATURATION: 99 % | WEIGHT: 219 LBS | HEIGHT: 68 IN | HEART RATE: 74 BPM

## 2021-09-11 DIAGNOSIS — E78.00 HIGH CHOLESTEROL: ICD-10-CM

## 2021-09-11 DIAGNOSIS — I25.10 CORONARY ARTERY DISEASE INVOLVING NATIVE CORONARY ARTERY OF NATIVE HEART WITHOUT ANGINA PECTORIS: ICD-10-CM

## 2021-09-11 DIAGNOSIS — I10 ESSENTIAL HYPERTENSION: Primary | ICD-10-CM

## 2021-09-11 DIAGNOSIS — M25.561 CHRONIC PAIN OF BOTH KNEES: ICD-10-CM

## 2021-09-11 DIAGNOSIS — R73.09 ELEVATED GLUCOSE: ICD-10-CM

## 2021-09-11 DIAGNOSIS — G89.29 CHRONIC PAIN OF BOTH KNEES: ICD-10-CM

## 2021-09-11 DIAGNOSIS — M25.562 CHRONIC PAIN OF BOTH KNEES: ICD-10-CM

## 2021-09-11 LAB
CHOLEST SMN-MCNC: 113 MG/DL (ref ?–200)
EST. AVERAGE GLUCOSE BLD GHB EST-MCNC: 128 MG/DL (ref 68–126)
HBA1C MFR BLD HPLC: 6.1 % (ref ?–5.7)
HDLC SERPL-MCNC: 46 MG/DL (ref 40–59)
LDLC SERPL CALC-MCNC: 52 MG/DL (ref ?–100)
NONHDLC SERPL-MCNC: 67 MG/DL (ref ?–130)
PATIENT FASTING Y/N/NP: YES
TRIGL SERPL-MCNC: 74 MG/DL (ref 30–149)
VLDLC SERPL CALC-MCNC: 11 MG/DL (ref 0–30)

## 2021-09-11 PROCEDURE — 99214 OFFICE O/P EST MOD 30 MIN: CPT | Performed by: INTERNAL MEDICINE

## 2021-09-11 PROCEDURE — 3008F BODY MASS INDEX DOCD: CPT | Performed by: INTERNAL MEDICINE

## 2021-09-11 PROCEDURE — 36415 COLL VENOUS BLD VENIPUNCTURE: CPT | Performed by: INTERNAL MEDICINE

## 2021-09-11 PROCEDURE — 3074F SYST BP LT 130 MM HG: CPT | Performed by: INTERNAL MEDICINE

## 2021-09-11 PROCEDURE — 3078F DIAST BP <80 MM HG: CPT | Performed by: INTERNAL MEDICINE

## 2021-09-11 NOTE — PROGRESS NOTES
Subjective:   Patient ID: Tamara Candelaria is a 61year old male. HPI  Patient comes in today for follow-up he is complaining today for bilateral knee pain left more than right he feels the pain was coming more going  downstairs no injury no numbness.   Hea spasms. (Patient not taking: Reported on 9/11/2021) 30 tablet 0     Allergies:No Known Allergies    Objective:   Physical Exam  Vitals and nursing note reviewed. Constitutional:       Appearance: He is well-developed.    HENT:      Head: Normocephalic and

## 2021-09-15 RX ORDER — AMLODIPINE BESYLATE 10 MG/1
10 TABLET ORAL DAILY
Qty: 90 TABLET | Refills: 1 | Status: SHIPPED | OUTPATIENT
Start: 2021-09-15

## 2021-09-15 RX ORDER — ATORVASTATIN CALCIUM 40 MG/1
TABLET, FILM COATED ORAL
Qty: 90 TABLET | Refills: 1 | Status: SHIPPED | OUTPATIENT
Start: 2021-09-15

## 2021-10-05 ENCOUNTER — HOSPITAL ENCOUNTER (OUTPATIENT)
Dept: GENERAL RADIOLOGY | Age: 56
Discharge: HOME | End: 2021-10-05
Attending: INTERNAL MEDICINE
Payer: COMMERCIAL

## 2021-10-05 DIAGNOSIS — M25.562 CHRONIC PAIN OF BOTH KNEES: ICD-10-CM

## 2021-10-05 DIAGNOSIS — G89.29 CHRONIC PAIN OF BOTH KNEES: ICD-10-CM

## 2021-10-05 DIAGNOSIS — M25.561 CHRONIC PAIN OF BOTH KNEES: ICD-10-CM

## 2021-10-05 PROCEDURE — 73560 X-RAY EXAM OF KNEE 1 OR 2: CPT | Performed by: INTERNAL MEDICINE

## 2021-12-04 ENCOUNTER — OFFICE VISIT (OUTPATIENT)
Dept: INTERNAL MEDICINE CLINIC | Facility: CLINIC | Age: 56
End: 2021-12-04
Payer: COMMERCIAL

## 2021-12-04 VITALS
SYSTOLIC BLOOD PRESSURE: 112 MMHG | WEIGHT: 216 LBS | OXYGEN SATURATION: 99 % | TEMPERATURE: 98 F | DIASTOLIC BLOOD PRESSURE: 64 MMHG | BODY MASS INDEX: 32.74 KG/M2 | RESPIRATION RATE: 17 BRPM | HEIGHT: 68 IN | HEART RATE: 67 BPM

## 2021-12-04 DIAGNOSIS — Z95.1 S/P CABG X 3: ICD-10-CM

## 2021-12-04 DIAGNOSIS — R05.9 COUGH: ICD-10-CM

## 2021-12-04 DIAGNOSIS — I25.10 CORONARY ARTERY DISEASE INVOLVING NATIVE CORONARY ARTERY OF NATIVE HEART WITHOUT ANGINA PECTORIS: ICD-10-CM

## 2021-12-04 DIAGNOSIS — R73.03 PRE-DIABETES: Primary | ICD-10-CM

## 2021-12-04 PROCEDURE — 90686 IIV4 VACC NO PRSV 0.5 ML IM: CPT | Performed by: INTERNAL MEDICINE

## 2021-12-04 PROCEDURE — 36415 COLL VENOUS BLD VENIPUNCTURE: CPT | Performed by: INTERNAL MEDICINE

## 2021-12-04 PROCEDURE — 99214 OFFICE O/P EST MOD 30 MIN: CPT | Performed by: INTERNAL MEDICINE

## 2021-12-04 PROCEDURE — 3008F BODY MASS INDEX DOCD: CPT | Performed by: INTERNAL MEDICINE

## 2021-12-04 PROCEDURE — 3078F DIAST BP <80 MM HG: CPT | Performed by: INTERNAL MEDICINE

## 2021-12-04 PROCEDURE — 90471 IMMUNIZATION ADMIN: CPT | Performed by: INTERNAL MEDICINE

## 2021-12-04 PROCEDURE — 3074F SYST BP LT 130 MM HG: CPT | Performed by: INTERNAL MEDICINE

## 2021-12-04 RX ORDER — PANTOPRAZOLE SODIUM 40 MG/1
TABLET, DELAYED RELEASE ORAL
Qty: 90 TABLET | Refills: 0 | Status: SHIPPED | OUTPATIENT
Start: 2021-12-04

## 2021-12-04 RX ORDER — PANTOPRAZOLE SODIUM 40 MG/1
40 TABLET, DELAYED RELEASE ORAL
Qty: 30 TABLET | Refills: 0 | Status: SHIPPED | OUTPATIENT
Start: 2021-12-04 | End: 2021-12-04

## 2021-12-04 NOTE — PROGRESS NOTES
Subjective:   Patient ID: Juanjo Prasad is a 61year old male. HPI  Patient comes in today for follow-up.   Patient today complains of on and off left-sided chest discomfort like a pinch does not last does not happen with exertion could happen anytime no tablet (20 mg total) by mouth 2 (two) times daily. 180 tablet 1   • multivitamin Oral Tab Take 1 tablet by mouth daily. 30 tablet 0   • Cholecalciferol (VITAMIN D3) 25 MCG (1000 UT) Oral Cap Take 1 tablet by mouth daily.      • ASPIRIN 81 OR Take 81 mg by m if cough continues    Orders Placed This Encounter      Hemoglobin A1C      Flulaval 6 months and older 0.5 ml PFS [85489]      Meds This Visit:  Requested Prescriptions      No prescriptions requested or ordered in this encounter       Imaging & Referrals

## 2021-12-21 ENCOUNTER — HOSPITAL ENCOUNTER (OUTPATIENT)
Dept: MRI IMAGING | Age: 56
Discharge: HOME | End: 2021-12-21
Attending: PHYSICIAN ASSISTANT
Payer: COMMERCIAL

## 2021-12-21 DIAGNOSIS — M50.30 OTHER CERVICAL DISC DEGENERATION, UNSPECIFIED CERVICAL REGION: ICD-10-CM

## 2021-12-21 DIAGNOSIS — M51.36 DEGENERATION OF INTERVERTEBRAL DISC OF LUMBAR REGION: ICD-10-CM

## 2021-12-21 PROCEDURE — 72141 MRI NECK SPINE W/O DYE: CPT | Performed by: PHYSICIAN ASSISTANT

## 2021-12-21 PROCEDURE — 72148 MRI LUMBAR SPINE W/O DYE: CPT | Performed by: PHYSICIAN ASSISTANT

## 2022-02-04 RX ORDER — LISINOPRIL 2.5 MG/1
TABLET ORAL
Qty: 90 TABLET | Refills: 1 | Status: SHIPPED | OUTPATIENT
Start: 2022-02-04 | End: 2022-08-05

## 2022-02-04 RX ORDER — METOPROLOL TARTRATE 50 MG/1
TABLET, FILM COATED ORAL
Qty: 180 TABLET | Refills: 1 | Status: SHIPPED | OUTPATIENT
Start: 2022-02-04 | End: 2022-08-05

## 2022-02-04 NOTE — TELEPHONE ENCOUNTER
Refill passed per [address] protocol.     Requested Prescriptions   Pending Prescriptions Disp Refills    METOPROLOL TARTRATE 50 MG Oral Tab [Pharmacy Med Name: METOPROLOL TARTRATE 50MG TABLETS] 180 tablet 1     Sig: TAKE 1 TABLET(50 MG) BY MOUTH TWICE DAILY        Hypertensive Medications Protocol Passed - 2/4/2022  9:40 AM        Passed - CMP or BMP in past 12 months        Passed - Appointment in past 6 or next 3 months        Passed - GFR Non- > 50     Lab Results   Component Value Date    [facility] 105 04/12/2021                    LISINOPRIL 2.5 MG Oral Tab [Pharmacy Med Name: LISINOPRIL 2.5MG TABLETS] 90 tablet 1     Sig: TAKE 1 TABLET(2.5 MG) BY MOUTH DAILY        Hypertensive Medications Protocol Passed - 2/4/2022  9:40 AM        Passed - CMP or BMP in past 12 months        Passed - Appointment in past 6 or next 3 months        Passed - GFR Non- > 50     Lab Results   Component Value Date    [facility] 105 04/12/2021                       Recent Outpatient Visits              [facility]    Office Visit    4 months ago Essential hypertension    Scarlet Mckee MD    Office Visit    7 months ago Annual physical exam    Melissa Tapia MD    Office Visit    8 months ago Ear bleeding, left    Scarlet Mckee MD    Office Visit    9 months ago S/P CABG x 3    Scarlet Mckee MD    Office Visit            Future Appointments         Provider Department Appt Notes    In 4 weeks Shirley Nuñez MD [address] [place] [address], [address] [place] [address]

## 2022-03-12 RX ORDER — AMLODIPINE BESYLATE 10 MG/1
TABLET ORAL
Qty: 90 TABLET | Refills: 1 | Status: SHIPPED | OUTPATIENT
Start: 2022-03-12

## 2022-03-12 RX ORDER — ATORVASTATIN CALCIUM 40 MG/1
TABLET, FILM COATED ORAL
Qty: 90 TABLET | Refills: 1 | Status: SHIPPED | OUTPATIENT
Start: 2022-03-12

## 2022-03-12 NOTE — TELEPHONE ENCOUNTER
Refill passed per [facility] protocol.    Requested Prescriptions   Pending Prescriptions Disp Refills    AMLODIPINE 10 MG Oral Tab [Pharmacy Med Name: AMLODIPINE BESYLATE 10MG TABLETS] 90 tablet 1     Sig: TAKE 1 TABLET(10 MG) BY MOUTH DAILY        Hypertensive Medications Protocol Passed - 3/12/2022 10:24 AM        Passed - CMP or BMP in past 12 months        Passed - Appointment in past 6 or next 3 months        Passed - GFR Non- > 50     Lab Results   Component Value Date    [facility] 105 04/12/2021                    ATORVASTATIN 40 MG Oral Tab [Pharmacy Med Name: ATORVASTATIN 40MG TABLETS] 90 tablet 1     Sig: TAKE 1 TABLET(40 MG) BY MOUTH EVERY NIGHT        Cholesterol Medication Protocol Passed - 3/12/2022 10:24 AM        Passed - ALT in past 12 months        Passed - LDL in past 12 months        Passed - Last ALT < 80       Lab Results   Component Value Date    ALT 27 04/08/2021             Passed - Last LDL < 130     Lab Results   Component Value Date    LDL 52 09/11/2021               Passed - Appointment in past 12 or next 3 months               Recent Outpatient Visits              3 months ago Micah Douglas MD    Office Visit    6 months ago [facility]ential [facility]    [facility]    Office Visit    9 months ago Annual physical exam    Gabriela Wilson MD    Office Visit    9 months ago Ear bleeding, left    Gabriela Wilson MD    Office Visit    10 months ago S/P CABG x 3    [employer] [place], [facility], [address], Jeanine Javier MD    Office Visit

## 2022-06-13 ENCOUNTER — LAB ENCOUNTER (OUTPATIENT)
Dept: LAB | Age: 57
End: 2022-06-13
Attending: INTERNAL MEDICINE
Payer: COMMERCIAL

## 2022-06-13 DIAGNOSIS — I25.10 CORONARY ATHEROSCLEROSIS OF NATIVE CORONARY ARTERY: Primary | ICD-10-CM

## 2022-06-13 DIAGNOSIS — E78.5 HYPERLIPIDEMIA: ICD-10-CM

## 2022-06-13 LAB
ANION GAP SERPL CALC-SCNC: 10 MMOL/L (ref 0–18)
BUN BLD-MCNC: 14 MG/DL (ref 7–18)
BUN/CREAT SERPL: 15.7 (ref 10–20)
CALCIUM BLD-MCNC: 9.3 MG/DL (ref 8.5–10.1)
CHLORIDE SERPL-SCNC: 104 MMOL/L (ref 98–112)
CHOLEST SERPL-MCNC: 112 MG/DL (ref ?–200)
CO2 SERPL-SCNC: 25 MMOL/L (ref 21–32)
CREAT BLD-MCNC: 0.89 MG/DL
FASTING PATIENT LIPID ANSWER: YES
FASTING STATUS PATIENT QL REPORTED: YES
GLUCOSE BLD-MCNC: 113 MG/DL (ref 70–99)
HDLC SERPL-MCNC: 39 MG/DL (ref 40–59)
LDLC SERPL CALC-MCNC: 58 MG/DL (ref ?–100)
NONHDLC SERPL-MCNC: 73 MG/DL (ref ?–130)
OSMOLALITY SERPL CALC.SUM OF ELEC: 289 MOSM/KG (ref 275–295)
POTASSIUM SERPL-SCNC: 4.3 MMOL/L (ref 3.5–5.1)
SODIUM SERPL-SCNC: 139 MMOL/L (ref 136–145)
TRIGL SERPL-MCNC: 72 MG/DL (ref 30–149)
VLDLC SERPL CALC-MCNC: 11 MG/DL (ref 0–30)

## 2022-06-13 PROCEDURE — 80048 BASIC METABOLIC PNL TOTAL CA: CPT

## 2022-06-13 PROCEDURE — 80061 LIPID PANEL: CPT

## 2022-06-13 PROCEDURE — 36415 COLL VENOUS BLD VENIPUNCTURE: CPT

## 2022-06-17 ENCOUNTER — MED REC SCAN ONLY (OUTPATIENT)
Dept: INTERNAL MEDICINE CLINIC | Facility: CLINIC | Age: 57
End: 2022-06-17

## 2022-08-05 RX ORDER — METOPROLOL TARTRATE 50 MG/1
TABLET, FILM COATED ORAL
Qty: 180 TABLET | Refills: 1 | Status: SHIPPED | OUTPATIENT
Start: 2022-08-05

## 2022-08-05 RX ORDER — LISINOPRIL 2.5 MG/1
TABLET ORAL
Qty: 90 TABLET | Refills: 1 | Status: SHIPPED | OUTPATIENT
Start: 2022-08-05

## 2022-09-07 RX ORDER — ATORVASTATIN CALCIUM 40 MG/1
TABLET, FILM COATED ORAL
Qty: 90 TABLET | Refills: 1 | Status: SHIPPED | OUTPATIENT
Start: 2022-09-07

## 2022-09-07 RX ORDER — AMLODIPINE BESYLATE 10 MG/1
TABLET ORAL
Qty: 90 TABLET | Refills: 1 | Status: SHIPPED | OUTPATIENT
Start: 2022-09-07

## 2022-09-21 ENCOUNTER — TELEPHONE (OUTPATIENT)
Dept: INTERNAL MEDICINE CLINIC | Facility: CLINIC | Age: 57
End: 2022-09-21

## 2022-10-06 NOTE — TELEPHONE ENCOUNTER
Called patient, no answer.  Left VM to call us back to schedule an annual physical.  Also, sent msg via [employer]

## 2022-10-10 NOTE — TELEPHONE ENCOUNTER
3rd attempt - Called patient, no answer. Left VM to call us back to schedule an annual physical. Pt still has not read [employer] as of yet, sent pt letter home instead.

## 2022-12-16 ENCOUNTER — MED REC SCAN ONLY (OUTPATIENT)
Dept: INTERNAL MEDICINE CLINIC | Facility: CLINIC | Age: 57
End: 2022-12-16

## 2023-02-06 RX ORDER — LISINOPRIL 2.5 MG/1
2.5 TABLET ORAL DAILY
Qty: 90 TABLET | Refills: 1 | Status: SHIPPED | OUTPATIENT
Start: 2023-02-06

## 2023-02-06 NOTE — TELEPHONE ENCOUNTER
Please review refill failed/no protocol     Requested Prescriptions     Pending Prescriptions Disp Refills    LISINOPRIL 2.5 MG Oral Tab [Pharmacy Med Name: LISINOPRIL 2.5MG TABLETS] 90 tablet 1     Sig: TAKE 1 TABLET(2.5 MG) BY MOUTH DAILY         Recent Visits  Date Type Provider Dept   12/04/21 Office Visit Mela Pineda MD [id number] Med   09/11/21 Office Visit Mela Pineda MD [id number] Med   Showing recent visits within past 540 days with a meds authorizing provider and meeting all other requirements  Future Appointments  Date Type Provider Dept   02/07/23 Appointment Mela Pineda MD [id number] Med   Showing future appointments within next 150 days with a meds authorizing provider and meeting all other requirements    Requested Prescriptions   Pending Prescriptions Disp Refills    LISINOPRIL 2.5 MG Oral Tab [Pharmacy Med Name: LISINOPRIL 2.5MG TABLETS] 90 tablet 1     Sig: TAKE 1 TABLET(2.5 MG) BY MOUTH DAILY       Hypertensive Medications Protocol Failed - 2/5/2023 11:36 AM        Failed - CMP or BMP in past 6 months     No results found for this or any previous visit (from the past 4392 hour(s)).             Failed - In person appointment or virtual visit in the past 6 months     Recent Outpatient Visits              1 year ago Nettie Mora MD    Office Visit    1 year ago Essential hypertension    [address] [facility], Esperanza Ye MD    Office Visit    1 year ago Annual physical exam    [address] [facility], Esperanza Ye MD    Office Visit    1 year ago Ear bleeding, left    [address] [facility], Esperanza Ye MD    Office Visit    1 year ago S/P CABG x 3    [address], [address], Jeny Seen, Antonio Dukes MD    Office Visit          Future Appointments         Provider Department Appt Notes    [facility] [address], [place], policy informed               Passed - In person appointment in the past 12 or next 3 months     Recent Outpatient Visits              1 year ago Esperanza Blackburn Se, MD    Office Visit    1 year ago Essential hypertension    [address] [facility], Esperanza Ye MD    Office Visit    1 year ago Annual physical exam    Sommer Munoz MD    Office Visit    1 year ago Ear bleeding, left    [address] [facility], Esperanza Ye MD    Office Visit    1 year ago S/P CABG x 3    [address] [facility], Esperanza Ye MD    Office Visit          Future Appointments         Provider Department Appt Notes    Tomorrow [facility], [address], [place], policy informed               Passed - Last BP reading less than 140/90     BP Readings from Last 1 Encounters:  12/04/21 : 112/64              Passed - EGFRCR or GFRNAA > 50     GFR Evaluation  GFRNAA: 94 , resulted on 6/13/2022

## 2023-02-07 ENCOUNTER — OFFICE VISIT (OUTPATIENT)
Dept: INTERNAL MEDICINE CLINIC | Facility: CLINIC | Age: 58
End: 2023-02-07

## 2023-02-07 VITALS
SYSTOLIC BLOOD PRESSURE: 124 MMHG | HEART RATE: 78 BPM | OXYGEN SATURATION: 96 % | HEIGHT: 68 IN | WEIGHT: 223 LBS | DIASTOLIC BLOOD PRESSURE: 68 MMHG | RESPIRATION RATE: 18 BRPM | BODY MASS INDEX: 33.8 KG/M2 | TEMPERATURE: 98 F

## 2023-02-07 DIAGNOSIS — Z95.1 S/P CABG X 3: ICD-10-CM

## 2023-02-07 DIAGNOSIS — L98.9 SKIN LESIONS: ICD-10-CM

## 2023-02-07 DIAGNOSIS — E78.00 HIGH CHOLESTEROL: ICD-10-CM

## 2023-02-07 DIAGNOSIS — I25.10 CORONARY ARTERY DISEASE INVOLVING NATIVE CORONARY ARTERY OF NATIVE HEART WITHOUT ANGINA PECTORIS: ICD-10-CM

## 2023-02-07 DIAGNOSIS — M25.561 CHRONIC PAIN OF BOTH KNEES: ICD-10-CM

## 2023-02-07 DIAGNOSIS — R73.09 ELEVATED GLUCOSE: ICD-10-CM

## 2023-02-07 DIAGNOSIS — I10 ESSENTIAL HYPERTENSION: ICD-10-CM

## 2023-02-07 DIAGNOSIS — M25.562 CHRONIC PAIN OF BOTH KNEES: ICD-10-CM

## 2023-02-07 DIAGNOSIS — R53.83 OTHER FATIGUE: ICD-10-CM

## 2023-02-07 DIAGNOSIS — R35.1 NOCTURIA: ICD-10-CM

## 2023-02-07 DIAGNOSIS — Z00.00 ENCOUNTER FOR PREVENTIVE CARE: ICD-10-CM

## 2023-02-07 DIAGNOSIS — M54.42 CHRONIC BILATERAL LOW BACK PAIN WITH LEFT-SIDED SCIATICA: ICD-10-CM

## 2023-02-07 DIAGNOSIS — G47.33 OSA (OBSTRUCTIVE SLEEP APNEA): ICD-10-CM

## 2023-02-07 DIAGNOSIS — M47.812 OSTEOARTHRITIS OF CERVICAL SPINE, UNSPECIFIED SPINAL OSTEOARTHRITIS COMPLICATION STATUS: ICD-10-CM

## 2023-02-07 DIAGNOSIS — G47.30 SLEEP DISORDER BREATHING: ICD-10-CM

## 2023-02-07 DIAGNOSIS — G89.29 CHRONIC PAIN OF BOTH KNEES: ICD-10-CM

## 2023-02-07 DIAGNOSIS — Z87.891 SMOKING HISTORY: ICD-10-CM

## 2023-02-07 DIAGNOSIS — G89.29 CHRONIC BILATERAL LOW BACK PAIN WITH LEFT-SIDED SCIATICA: ICD-10-CM

## 2023-02-07 DIAGNOSIS — R06.83 SNORING: ICD-10-CM

## 2023-02-07 DIAGNOSIS — Z00.00 ANNUAL PHYSICAL EXAM: Primary | ICD-10-CM

## 2023-02-07 LAB
ALBUMIN SERPL-MCNC: 3.9 G/DL (ref 3.4–5)
ALBUMIN/GLOB SERPL: 1 {RATIO} (ref 1–2)
ALP LIVER SERPL-CCNC: 70 U/L
ALT SERPL-CCNC: 41 U/L
ANION GAP SERPL CALC-SCNC: 10 MMOL/L (ref 0–18)
AST SERPL-CCNC: 16 U/L (ref 15–37)
BASOPHILS # BLD AUTO: 0.03 X10(3) UL (ref 0–0.2)
BASOPHILS NFR BLD AUTO: 0.5 %
BILIRUB SERPL-MCNC: 0.7 MG/DL (ref 0.1–2)
BILIRUB UR QL: NEGATIVE
BUN BLD-MCNC: 13 MG/DL (ref 7–18)
BUN/CREAT SERPL: 15.7 (ref 10–20)
CALCIUM BLD-MCNC: 8.9 MG/DL (ref 8.5–10.1)
CHLORIDE SERPL-SCNC: 105 MMOL/L (ref 98–112)
CHOLEST SERPL-MCNC: 100 MG/DL (ref ?–200)
CLARITY UR: CLEAR
CO2 SERPL-SCNC: 28 MMOL/L (ref 21–32)
COLOR UR: YELLOW
COMPLEXED PSA SERPL-MCNC: 0.7 NG/ML (ref ?–4)
CREAT BLD-MCNC: 0.83 MG/DL
DEPRECATED RDW RBC AUTO: 46.9 FL (ref 35.1–46.3)
EOSINOPHIL # BLD AUTO: 0.11 X10(3) UL (ref 0–0.7)
EOSINOPHIL NFR BLD AUTO: 1.9 %
ERYTHROCYTE [DISTWIDTH] IN BLOOD BY AUTOMATED COUNT: 13.5 % (ref 11–15)
EST. AVERAGE GLUCOSE BLD GHB EST-MCNC: 126 MG/DL (ref 68–126)
FASTING PATIENT LIPID ANSWER: YES
FASTING STATUS PATIENT QL REPORTED: YES
GFR SERPLBLD BASED ON 1.73 SQ M-ARVRAT: 101 ML/MIN/1.73M2 (ref 60–?)
GLOBULIN PLAS-MCNC: 3.9 G/DL (ref 2.8–4.4)
GLUCOSE BLD-MCNC: 95 MG/DL (ref 70–99)
GLUCOSE UR-MCNC: NEGATIVE MG/DL
HBA1C MFR BLD: 6 % (ref ?–5.7)
HCT VFR BLD AUTO: 49.3 %
HDLC SERPL-MCNC: 37 MG/DL (ref 40–59)
HGB BLD-MCNC: 16.1 G/DL
HGB UR QL STRIP.AUTO: NEGATIVE
IMM GRANULOCYTES # BLD AUTO: 0.01 X10(3) UL (ref 0–1)
IMM GRANULOCYTES NFR BLD: 0.2 %
KETONES UR-MCNC: NEGATIVE MG/DL
LDLC SERPL CALC-MCNC: 46 MG/DL (ref ?–100)
LEUKOCYTE ESTERASE UR QL STRIP.AUTO: NEGATIVE
LYMPHOCYTES # BLD AUTO: 1.48 X10(3) UL (ref 1–4)
LYMPHOCYTES NFR BLD AUTO: 25.4 %
MCH RBC QN AUTO: 30.7 PG (ref 26–34)
MCHC RBC AUTO-ENTMCNC: 32.7 G/DL (ref 31–37)
MCV RBC AUTO: 94.1 FL
MONOCYTES # BLD AUTO: 0.27 X10(3) UL (ref 0.1–1)
MONOCYTES NFR BLD AUTO: 4.6 %
NEUTROPHILS # BLD AUTO: 3.92 X10 (3) UL (ref 1.5–7.7)
NEUTROPHILS # BLD AUTO: 3.92 X10(3) UL (ref 1.5–7.7)
NEUTROPHILS NFR BLD AUTO: 67.4 %
NITRITE UR QL STRIP.AUTO: NEGATIVE
NONHDLC SERPL-MCNC: 63 MG/DL (ref ?–130)
OSMOLALITY SERPL CALC.SUM OF ELEC: 296 MOSM/KG (ref 275–295)
PH UR: 7.5 [PH] (ref 5–8)
PLATELET # BLD AUTO: 220 10(3)UL (ref 150–450)
POTASSIUM SERPL-SCNC: 4 MMOL/L (ref 3.5–5.1)
PROT SERPL-MCNC: 7.8 G/DL (ref 6.4–8.2)
PROT UR-MCNC: NEGATIVE MG/DL
RBC # BLD AUTO: 5.24 X10(6)UL
SODIUM SERPL-SCNC: 143 MMOL/L (ref 136–145)
SP GR UR STRIP: 1.02 (ref 1–1.03)
TRIGL SERPL-MCNC: 83 MG/DL (ref 30–149)
TSI SER-ACNC: 1.15 MIU/ML (ref 0.36–3.74)
UROBILINOGEN UR STRIP-ACNC: 0.2
VLDLC SERPL CALC-MCNC: 12 MG/DL (ref 0–30)
WBC # BLD AUTO: 5.8 X10(3) UL (ref 4–11)

## 2023-02-07 PROCEDURE — 90677 PCV20 VACCINE IM: CPT | Performed by: INTERNAL MEDICINE

## 2023-02-07 PROCEDURE — 3078F DIAST BP <80 MM HG: CPT | Performed by: INTERNAL MEDICINE

## 2023-02-07 PROCEDURE — 90471 IMMUNIZATION ADMIN: CPT | Performed by: INTERNAL MEDICINE

## 2023-02-07 PROCEDURE — 90715 TDAP VACCINE 7 YRS/> IM: CPT | Performed by: INTERNAL MEDICINE

## 2023-02-07 PROCEDURE — 3074F SYST BP LT 130 MM HG: CPT | Performed by: INTERNAL MEDICINE

## 2023-02-07 PROCEDURE — 99396 PREV VISIT EST AGE 40-64: CPT | Performed by: INTERNAL MEDICINE

## 2023-02-07 PROCEDURE — 36415 COLL VENOUS BLD VENIPUNCTURE: CPT | Performed by: INTERNAL MEDICINE

## 2023-02-07 PROCEDURE — 3008F BODY MASS INDEX DOCD: CPT | Performed by: INTERNAL MEDICINE

## 2023-02-07 PROCEDURE — 90472 IMMUNIZATION ADMIN EACH ADD: CPT | Performed by: INTERNAL MEDICINE

## 2023-02-07 RX ORDER — METOPROLOL TARTRATE 100 MG/1
100 TABLET ORAL 2 TIMES DAILY
COMMUNITY
Start: 2022-12-14

## 2023-02-07 RX ORDER — CYCLOBENZAPRINE HCL 5 MG
5 TABLET ORAL NIGHTLY
Qty: 30 TABLET | Refills: 0 | Status: SHIPPED | OUTPATIENT
Start: 2023-02-07

## 2023-02-07 RX ORDER — METHYLPREDNISOLONE 4 MG/1
TABLET ORAL
Qty: 1 EACH | Refills: 0 | Status: SHIPPED | OUTPATIENT
Start: 2023-02-07

## 2023-02-23 ENCOUNTER — OFFICE VISIT (OUTPATIENT)
Dept: SLEEP CENTER | Age: 58
End: 2023-02-23
Attending: INTERNAL MEDICINE
Payer: COMMERCIAL

## 2023-02-23 DIAGNOSIS — G47.33 OSA (OBSTRUCTIVE SLEEP APNEA): ICD-10-CM

## 2023-02-23 DIAGNOSIS — R53.83 OTHER FATIGUE: ICD-10-CM

## 2023-02-23 DIAGNOSIS — G47.30 SLEEP DISORDER BREATHING: ICD-10-CM

## 2023-02-23 DIAGNOSIS — R06.83 SNORING: ICD-10-CM

## 2023-02-23 PROCEDURE — 95810 POLYSOM 6/> YRS 4/> PARAM: CPT

## 2023-03-02 ENCOUNTER — TELEPHONE (OUTPATIENT)
Dept: INTERNAL MEDICINE CLINIC | Facility: CLINIC | Age: 58
End: 2023-03-02

## 2023-03-02 NOTE — TELEPHONE ENCOUNTER
Patient had a recent sleep study completed and needs a titration. He is calling today to ask if he should see Dr. Aleksey De La Cruz first before getting the titration because if Dr. Aleksey De La Cruz see's something that would be causing him to snore then, patient many not need to complete the titration. Advised ok to see ENT first and then if need be schedule the titration.  Patient would like to avoid having to use cpap

## 2023-03-07 ENCOUNTER — OFFICE VISIT (OUTPATIENT)
Dept: OTOLARYNGOLOGY | Facility: CLINIC | Age: 58
End: 2023-03-07

## 2023-03-07 DIAGNOSIS — G47.33 OBSTRUCTIVE SLEEP APNEA: Primary | ICD-10-CM

## 2023-03-07 PROCEDURE — 99243 OFF/OP CNSLTJ NEW/EST LOW 30: CPT | Performed by: OTOLARYNGOLOGY

## 2023-03-10 ENCOUNTER — ORDER TRANSCRIPTION (OUTPATIENT)
Dept: SLEEP CENTER | Age: 58
End: 2023-03-10

## 2023-03-10 DIAGNOSIS — G47.33 OBSTRUCTIVE SLEEP APNEA (ADULT) (PEDIATRIC): Primary | ICD-10-CM

## 2023-03-10 RX ORDER — ATORVASTATIN CALCIUM 40 MG/1
40 TABLET, FILM COATED ORAL NIGHTLY
Qty: 90 TABLET | Refills: 3 | Status: SHIPPED | OUTPATIENT
Start: 2023-03-10

## 2023-03-10 RX ORDER — AMLODIPINE BESYLATE 10 MG/1
10 TABLET ORAL DAILY
Qty: 90 TABLET | Refills: 3 | Status: SHIPPED | OUTPATIENT
Start: 2023-03-10

## 2023-03-10 NOTE — TELEPHONE ENCOUNTER
Refill passed per [address] protocol.    Requested Prescriptions   Pending Prescriptions Disp Refills    ATORVASTATIN 40 MG Oral Tab [Pharmacy Med Name: ATORVASTATIN 40MG TABLETS] 90 tablet 1     Sig: TAKE 1 TABLET(40 MG) BY MOUTH EVERY NIGHT       Cholesterol Medication Protocol Passed - 3/9/2023 10:07 AM        Passed - ALT in past 12 months        Passed - LDL in past 12 months        Passed - Last ALT < 80     Lab Results   Component Value Date    ALT 41 02/07/2023             Passed - Last LDL < 130     Lab Results   Component Value Date    LDL 46 02/07/2023             Passed - In person appointment or virtual visit in the past 12 mos or appointment in next 3 mos     Recent Outpatient Visits              3 days ago Obstructive sleep apnea    [address] [facility], Zahra Torres MD    Office Visit    2 weeks ago Other fatigue    [address] [facility]    Office Visit    1 month ago Annual physical exam    [address], David Lester MD    Office Visit    1 year ago Anh Padilla, David Lester MD    Office Visit    1 year ago Essential hypertension    [address], Davdi Lester MD    Office Visit          Future Appointments         Provider Department Appt Notes    In 5 days [address] Ultrasound     [facility]                  AMLODIPINE 10 MG Oral Tab [Pharmacy Med Name: AMLODIPINE BESYLATE 10MG TABLETS] 90 tablet 1     Sig: TAKE 1 TABLET(10 MG) BY MOUTH DAILY       Hypertensive Medications Protocol Passed - 3/9/2023 10:07 AM        Passed - In person appointment in the past 12 or next 3 months     Recent Outpatient Visits              3 days ago Obstructive sleep apnea    [address], [address], Zahra Torres MD    Office Visit    2 weeks ago Other fatigue    [address] [facility]    Office Visit    1 month ago Annual physical exam    Tania Fernandez MD    Office Visit    1 year ago Tania Burger MD    Office Visit    1 year ago Essential hypertension    Tania Fernandez MD    Office Visit          Future Appointments         Provider Department Appt Notes    In 5 days [address] Ultrasound     In 5 days [address] ECHO [place] [address] BP reading less than 140/90     BP Readings from Last 1 Encounters:  02/07/23 : 124/68              Passed - CMP or BMP in past 6 months     Recent Results (from the past 4392 hour(s))   COMP METABOLIC PANEL (14)    Collection Time: 02/07/23 12:30 PM   Result Value Ref Range    Glucose 95 70 - 99 mg/dL    Sodium 143 136 - 145 mmol/L    Potassium 4.0 3.5 - 5.1 mmol/L    Chloride 105 98 - 112 mmol/L    CO2 28.0 21.0 - 32.0 mmol/L    Anion Gap 10 0 - 18 mmol/L    BUN 13 7 - 18 mg/dL    Creatinine 0.83 0.70 - 1.30 mg/dL    BUN/CREA Ratio 15.7 10.0 - 20.0    Calcium, Total 8.9 8.5 - 10.1 mg/dL    Calculated Osmolality 296 (H) 275 - 295 mOsm/kg    eGFR-Cr 101 >=60 mL/min/1.73m2    ALT 41 16 - 61 U/L    AST 16 15 - 37 U/L    Alkaline Phosphatase 70 45 - 117 U/L    Bilirubin, Total 0.7 0.1 - 2.0 mg/dL    Total Protein 7.8 6.4 - 8.2 g/dL    Albumin 3.9 3.4 - 5.0 g/dL    Globulin  3.9 2.8 - 4.4 g/dL    A/G Ratio 1.0 1.0 - 2.0    Patient Fasting for CMP? Yes      *Note: Due to a large number of results and/or encounters for the requested time period, some results have not been displayed. A complete set of results can be found in Results Review.                Passed - In person appointment or virtual visit in the past 6 months     Recent Outpatient Visits 3 days ago Obstructive sleep apnea    [facility], [address], Ruben Torres MD    Office Visit    2 weeks ago Other fatigue    [facility]    Office Visit    1 month ago Annual physical exam    [address], Mann Parikh MD    Office Visit    1 year ago Eloy Rachel, Mann Parikh MD    Office Visit    1 year ago Essential hypertension    [address] [facility], Mann Parikh MD    Office Visit          Future Appointments         Provider Department Appt Notes    In 5 days [address] Ultrasound     [facility] [address] or GFRNAA > 50     GFR Evaluation  EGFRCR: 101 , resulted on 2/7/2023               Recent Outpatient Visits              3 days ago Obstructive sleep apnea    [address],[address], [address], Ruben Torres MD    Office Visit    2 weeks ago Other fatigue    [facility]    Office Visit    1 month ago Annual physical exam    [address], Mnan Parikh MD    Office Visit    1 year ago Eloy Rachel, Mann Parikh MD    Office Visit    1 year ago Essential hypertension    [address] [facility], Mann Parikh MD    Office Visit            Future Appointments         Provider Department Appt Notes    In 5 days [address] Ultrasound     In 5 days [address] [facility]

## 2023-03-15 ENCOUNTER — HOSPITAL ENCOUNTER (OUTPATIENT)
Dept: CV DIAGNOSTICS | Facility: HOSPITAL | Age: 58
Discharge: HOME | End: 2023-03-15
Attending: INTERNAL MEDICINE
Payer: COMMERCIAL

## 2023-03-15 ENCOUNTER — HOSPITAL ENCOUNTER (OUTPATIENT)
Dept: ULTRASOUND IMAGING | Facility: HOSPITAL | Age: 58
Discharge: HOME | End: 2023-03-15
Attending: INTERNAL MEDICINE
Payer: COMMERCIAL

## 2023-03-15 DIAGNOSIS — I25.10 CORONARY ATHEROSCLEROSIS OF NATIVE CORONARY ARTERY: ICD-10-CM

## 2023-03-15 DIAGNOSIS — I65.23 BILATERAL CAROTID ARTERY STENOSIS: ICD-10-CM

## 2023-03-15 PROCEDURE — 93880 EXTRACRANIAL BILAT STUDY: CPT | Performed by: INTERNAL MEDICINE

## 2023-03-15 PROCEDURE — 93306 TTE W/DOPPLER COMPLETE: CPT | Performed by: INTERNAL MEDICINE

## 2023-03-25 ENCOUNTER — LAB ENCOUNTER (OUTPATIENT)
Dept: LAB | Age: 58
End: 2023-03-25
Attending: INTERNAL MEDICINE
Payer: COMMERCIAL

## 2023-03-25 DIAGNOSIS — G47.33 OBSTRUCTIVE SLEEP APNEA (ADULT) (PEDIATRIC): ICD-10-CM

## 2023-03-26 LAB — SARS-COV-2 RNA RESP QL NAA+PROBE: NOT DETECTED

## 2023-03-28 ENCOUNTER — OFFICE VISIT (OUTPATIENT)
Dept: SLEEP CENTER | Age: 58
End: 2023-03-28
Attending: INTERNAL MEDICINE
Payer: COMMERCIAL

## 2023-03-28 DIAGNOSIS — G47.33 OSA (OBSTRUCTIVE SLEEP APNEA): ICD-10-CM

## 2023-03-28 PROCEDURE — 95811 POLYSOM 6/>YRS CPAP 4/> PARM: CPT

## 2023-03-29 ENCOUNTER — TELEPHONE (OUTPATIENT)
Dept: OTOLARYNGOLOGY | Facility: CLINIC | Age: 58
End: 2023-03-29

## 2023-03-29 NOTE — TELEPHONE ENCOUNTER
Patient is calling to let Dr Silvio Gonzalez known he has done the titration study and requesting a cpap order.

## 2023-03-30 ENCOUNTER — TELEPHONE (OUTPATIENT)
Dept: INTERNAL MEDICINE CLINIC | Facility: CLINIC | Age: 58
End: 2023-03-30

## 2023-04-10 NOTE — TELEPHONE ENCOUNTER
Patient received Order for CPAP with supplies by Dr. Genaro Terrazas. Please refer to 3/30/23 sleep study result. Thank you.

## 2023-05-02 ENCOUNTER — APPOINTMENT (OUTPATIENT)
Dept: MRI IMAGING | Facility: HOSPITAL | Age: 58
End: 2023-05-02
Attending: EMERGENCY MEDICINE
Payer: COMMERCIAL

## 2023-05-02 ENCOUNTER — NURSE TRIAGE (OUTPATIENT)
Dept: INTERNAL MEDICINE CLINIC | Facility: CLINIC | Age: 58
End: 2023-05-02

## 2023-05-02 ENCOUNTER — HOSPITAL ENCOUNTER (EMERGENCY)
Facility: HOSPITAL | Age: 58
Discharge: HOME | End: 2023-05-02
Attending: EMERGENCY MEDICINE
Payer: COMMERCIAL

## 2023-05-02 VITALS
TEMPERATURE: 99 F | HEIGHT: 69 IN | BODY MASS INDEX: 32.58 KG/M2 | SYSTOLIC BLOOD PRESSURE: 119 MMHG | HEART RATE: 68 BPM | OXYGEN SATURATION: 98 % | DIASTOLIC BLOOD PRESSURE: 61 MMHG | WEIGHT: 220 LBS | RESPIRATION RATE: 15 BRPM

## 2023-05-02 DIAGNOSIS — G45.9 TIA (TRANSIENT ISCHEMIC ATTACK): Primary | ICD-10-CM

## 2023-05-02 DIAGNOSIS — G45.9 TIA DUE TO EMBOLISM (HCC): ICD-10-CM

## 2023-05-02 DIAGNOSIS — I74.9 TIA DUE TO EMBOLISM (HCC): ICD-10-CM

## 2023-05-02 LAB
ANION GAP SERPL CALC-SCNC: 5 MMOL/L (ref 0–18)
BASOPHILS # BLD AUTO: 0.04 X10(3) UL (ref 0–0.2)
BASOPHILS NFR BLD AUTO: 0.4 %
BUN BLD-MCNC: 17 MG/DL (ref 7–18)
BUN/CREAT SERPL: 20 (ref 10–20)
CALCIUM BLD-MCNC: 8.7 MG/DL (ref 8.5–10.1)
CHLORIDE SERPL-SCNC: 106 MMOL/L (ref 98–112)
CO2 SERPL-SCNC: 29 MMOL/L (ref 21–32)
CREAT BLD-MCNC: 0.85 MG/DL
DEPRECATED RDW RBC AUTO: 43.2 FL (ref 35.1–46.3)
EOSINOPHIL # BLD AUTO: 0.12 X10(3) UL (ref 0–0.7)
EOSINOPHIL NFR BLD AUTO: 1.3 %
ERYTHROCYTE [DISTWIDTH] IN BLOOD BY AUTOMATED COUNT: 12.8 % (ref 11–15)
GFR SERPLBLD BASED ON 1.73 SQ M-ARVRAT: 101 ML/MIN/1.73M2 (ref 60–?)
GLUCOSE BLD-MCNC: 90 MG/DL (ref 70–99)
HCT VFR BLD AUTO: 44.1 %
HGB BLD-MCNC: 15 G/DL
IMM GRANULOCYTES # BLD AUTO: 0.01 X10(3) UL (ref 0–1)
IMM GRANULOCYTES NFR BLD: 0.1 %
LYMPHOCYTES # BLD AUTO: 1.86 X10(3) UL (ref 1–4)
LYMPHOCYTES NFR BLD AUTO: 20.7 %
MCH RBC QN AUTO: 31.3 PG (ref 26–34)
MCHC RBC AUTO-ENTMCNC: 34 G/DL (ref 31–37)
MCV RBC AUTO: 91.9 FL
MONOCYTES # BLD AUTO: 0.54 X10(3) UL (ref 0.1–1)
MONOCYTES NFR BLD AUTO: 6 %
NEUTROPHILS # BLD AUTO: 6.43 X10 (3) UL (ref 1.5–7.7)
NEUTROPHILS # BLD AUTO: 6.43 X10(3) UL (ref 1.5–7.7)
NEUTROPHILS NFR BLD AUTO: 71.5 %
OSMOLALITY SERPL CALC.SUM OF ELEC: 291 MOSM/KG (ref 275–295)
PLATELET # BLD AUTO: 213 10(3)UL (ref 150–450)
POTASSIUM SERPL-SCNC: 3.7 MMOL/L (ref 3.5–5.1)
RBC # BLD AUTO: 4.8 X10(6)UL
SODIUM SERPL-SCNC: 140 MMOL/L (ref 136–145)
WBC # BLD AUTO: 9 X10(3) UL (ref 4–11)

## 2023-05-02 PROCEDURE — 99284 EMERGENCY DEPT VISIT MOD MDM: CPT

## 2023-05-02 PROCEDURE — 70551 MRI BRAIN STEM W/O DYE: CPT | Performed by: EMERGENCY MEDICINE

## 2023-05-02 PROCEDURE — 93010 ELECTROCARDIOGRAM REPORT: CPT

## 2023-05-02 PROCEDURE — 99285 EMERGENCY DEPT VISIT HI MDM: CPT

## 2023-05-02 PROCEDURE — 93005 ELECTROCARDIOGRAM TRACING: CPT

## 2023-05-02 PROCEDURE — 80048 BASIC METABOLIC PNL TOTAL CA: CPT | Performed by: EMERGENCY MEDICINE

## 2023-05-02 PROCEDURE — 36415 COLL VENOUS BLD VENIPUNCTURE: CPT

## 2023-05-02 PROCEDURE — 85025 COMPLETE CBC W/AUTO DIFF WBC: CPT | Performed by: EMERGENCY MEDICINE

## 2023-05-02 RX ORDER — ASPIRIN 81 MG/1
81 TABLET ORAL DAILY
Qty: 30 TABLET | Refills: 0 | Status: SHIPPED | OUTPATIENT
Start: 2023-05-02 | End: 2023-05-02

## 2023-05-02 RX ORDER — CLOPIDOGREL BISULFATE 75 MG/1
75 TABLET ORAL DAILY
Qty: 20 TABLET | Refills: 0 | Status: SHIPPED | OUTPATIENT
Start: 2023-05-02

## 2023-05-02 RX ORDER — CLOPIDOGREL BISULFATE 75 MG/1
75 TABLET ORAL DAILY
Qty: 30 TABLET | Refills: 0 | Status: SHIPPED | OUTPATIENT
Start: 2023-05-02

## 2023-05-02 NOTE — TELEPHONE ENCOUNTER
Patient will need to go to ER for further evaluation with his history of medical problems it could be what we call a TIA transient ischemic attack  Patient did have an ultrasound carotid recently about a month ago with Dr. Mccauley Getting which had some abnormality so again he needs to go to ER for further evaluation I will let Dr. Mccauley Getting know about this also

## 2023-05-02 NOTE — ED INITIAL ASSESSMENT (HPI)
Pt arrived via triage for c/o blurred vision to right eye upon waking this morning which lasted approximately 1 hour. Pt woke up at 0500. Pt states he sleeps with a rag over his eyes. Pt denies HA, dizziness, n/v, numbness, tingling, weakness, CP or ALEXUS. Pt is in no distress at this time, AAOx4, ambulatory with steady gait, negative BEFAST in triage.  Pt reports no symptoms at this time

## 2023-05-02 NOTE — TELEPHONE ENCOUNTER
Patient notified of provider's recommendation. Patient verbalized understanding. Patient agreed to go to ER.

## 2023-05-03 ENCOUNTER — TELEPHONE (OUTPATIENT)
Dept: NEUROLOGY | Facility: CLINIC | Age: 58
End: 2023-05-03

## 2023-05-03 ENCOUNTER — PATIENT OUTREACH (OUTPATIENT)
Dept: CASE MANAGEMENT | Age: 58
End: 2023-05-03

## 2023-05-03 ENCOUNTER — OFFICE VISIT (OUTPATIENT)
Dept: NEUROLOGY | Facility: CLINIC | Age: 58
End: 2023-05-03
Payer: COMMERCIAL

## 2023-05-03 VITALS
HEIGHT: 69 IN | SYSTOLIC BLOOD PRESSURE: 130 MMHG | BODY MASS INDEX: 32.58 KG/M2 | DIASTOLIC BLOOD PRESSURE: 70 MMHG | WEIGHT: 220 LBS

## 2023-05-03 DIAGNOSIS — G45.9 TIA (TRANSIENT ISCHEMIC ATTACK): Primary | ICD-10-CM

## 2023-05-03 LAB
ATRIAL RATE: 66 BPM
P AXIS: 16 DEGREES
P-R INTERVAL: 226 MS
Q-T INTERVAL: 446 MS
QRS DURATION: 100 MS
QTC CALCULATION (BEZET): 467 MS
R AXIS: -25 DEGREES
T AXIS: 11 DEGREES
VENTRICULAR RATE: 66 BPM

## 2023-05-03 PROCEDURE — 3008F BODY MASS INDEX DOCD: CPT | Performed by: OTHER

## 2023-05-03 PROCEDURE — 3078F DIAST BP <80 MM HG: CPT | Performed by: OTHER

## 2023-05-03 PROCEDURE — 99204 OFFICE O/P NEW MOD 45 MIN: CPT | Performed by: OTHER

## 2023-05-03 PROCEDURE — 3075F SYST BP GE 130 - 139MM HG: CPT | Performed by: OTHER

## 2023-05-03 NOTE — TELEPHONE ENCOUNTER
[facility]  _________    Seen in ER on 5/2 for TIA  Discharged @ 1855    Pt scheduled with Dr Shandra Simons for 5/3/23 @ 1pm.    Pt declined echocardiogram with bubbles. Pt does not feel he needs testing as he had a recent echocardiogram 3/15/23 & reports history of cardiac bypass. Provider Dayanna Galaviz) notified in person.

## 2023-05-03 NOTE — PROGRESS NOTES
1st attempt EDTE [facility] apt request  PCP -pt would like to get test results back before scheduling   Neuro -existing apt (5/3)  Closing encounter

## 2023-05-12 ENCOUNTER — TELEPHONE (OUTPATIENT)
Dept: INTERNAL MEDICINE CLINIC | Facility: CLINIC | Age: 58
End: 2023-05-12

## 2023-05-12 NOTE — TELEPHONE ENCOUNTER
Order for CPAP and supplies have been faxed to [facility] Fax# [phone] along with H&P and demographics.

## 2023-05-16 ENCOUNTER — TELEPHONE (OUTPATIENT)
Dept: NEUROLOGY | Facility: CLINIC | Age: 58
End: 2023-05-16

## 2023-05-16 ENCOUNTER — HOSPITAL ENCOUNTER (OUTPATIENT)
Dept: CV DIAGNOSTICS | Facility: HOSPITAL | Age: 58
Discharge: HOME | End: 2023-05-16
Attending: Other
Payer: COMMERCIAL

## 2023-05-16 ENCOUNTER — HOSPITAL ENCOUNTER (OUTPATIENT)
Dept: CT IMAGING | Facility: HOSPITAL | Age: 58
Discharge: HOME | End: 2023-05-16
Attending: INTERNAL MEDICINE
Payer: COMMERCIAL

## 2023-05-16 ENCOUNTER — HOSPITAL ENCOUNTER (OUTPATIENT)
Dept: CT IMAGING | Facility: HOSPITAL | Age: 58
Discharge: HOME | End: 2023-05-16
Attending: Other
Payer: COMMERCIAL

## 2023-05-16 DIAGNOSIS — Z87.891 SMOKING HISTORY: ICD-10-CM

## 2023-05-16 DIAGNOSIS — G45.9 TIA (TRANSIENT ISCHEMIC ATTACK): ICD-10-CM

## 2023-05-16 PROCEDURE — 70496 CT ANGIOGRAPHY HEAD: CPT | Performed by: OTHER

## 2023-05-16 PROCEDURE — 93227 XTRNL ECG REC<48 HR R&I: CPT | Performed by: OTHER

## 2023-05-16 PROCEDURE — 70498 CT ANGIOGRAPHY NECK: CPT | Performed by: OTHER

## 2023-05-16 PROCEDURE — 71271 CT THORAX LUNG CANCER SCR C-: CPT | Performed by: INTERNAL MEDICINE

## 2023-05-16 PROCEDURE — 93225 XTRNL ECG REC<48 HRS REC: CPT | Performed by: OTHER

## 2023-05-16 NOTE — TELEPHONE ENCOUNTER
----- Message from Soeldad Laurent MD sent at 5/16/2023 12:23 PM CDT -----  Please let patient know that CTA brain didn't show significant abnormalities.

## 2023-05-19 ENCOUNTER — TELEPHONE (OUTPATIENT)
Dept: NEUROLOGY | Facility: CLINIC | Age: 58
End: 2023-05-19

## 2023-05-19 NOTE — TELEPHONE ENCOUNTER
----- Message from Yin Fontanez MD sent at 5/19/2023  9:14 AM CDT -----  Please let the patient know that cardiac monitor didn't show signs of the a. Fib that could have contributed to the TIA/stroke symptoms.

## 2023-06-02 ENCOUNTER — MED REC SCAN ONLY (OUTPATIENT)
Dept: INTERNAL MEDICINE CLINIC | Facility: CLINIC | Age: 58
End: 2023-06-02

## 2023-07-31 ENCOUNTER — OFFICE VISIT (OUTPATIENT)
Dept: NEUROLOGY | Facility: CLINIC | Age: 58
End: 2023-07-31
Payer: COMMERCIAL

## 2023-07-31 VITALS
SYSTOLIC BLOOD PRESSURE: 110 MMHG | WEIGHT: 225 LBS | BODY MASS INDEX: 33.33 KG/M2 | HEIGHT: 69 IN | DIASTOLIC BLOOD PRESSURE: 62 MMHG

## 2023-07-31 DIAGNOSIS — G45.9 TIA (TRANSIENT ISCHEMIC ATTACK): Primary | ICD-10-CM

## 2023-07-31 NOTE — PROGRESS NOTES
Neurology Follow up Visit     Referred By: Dr. Marcio Cox    Chief Complaint: Patient presents with:  TIA: LOV05/03/2023 patient presents as a follow up for TIA. Patient states that he still have numbness in both hands. HPI:     Wilbur Rubalcava is a 60year old male, who presents for vision loss in the right eye in May 2023. Apparently patient woke up and had about half an hour of the vision loss in her right eye, specifically it was blurry, he closed left eye it was very complicated he closes the right eye it was clear out of the left eye. He denies any other focal neurological symptoms. He had a recent work-up by cardiologist including echocardiogram and Doppler carotids which showed left CCA stenosis. CT angiogram was planned but was not done yet. MRI of the brain was done in the emergency room, that was not revealing to any acute changes. He was started on Plavix in addition to aspirin that he was already taking. His LDL is quite excellent, he continues to control his blood pressure. CT angiogram was done, no significant stenosis mentioned. Holter monitor was not revealing. Patient continued with aspirin, eventually Plavix was stopped. He does describe occasional numbness in both hands especially in the morning when he wakes up. .     Past Medical History:   Diagnosis Date    Coronary atherosclerosis     Essential hypertension 2/14/2017    High blood pressure     High cholesterol        No past surgical history on file. Social history:  Smoking status:   Former   0.50 Packs/day   For 30.00 Years      Quit date:   2/1/2021      Smokeless tobacco:   Never       Alcohol use   No       Drug use:   No         Family History   Problem Relation Age of Onset    Prostate Cancer Father     Prostate Cancer Paternal Uncle     Diabetes Maternal Grandfather     Hypertension Mother          Current Outpatient Medications:     lisinopril 2.5 MG Oral Tab, Take 1 tablet (2.5 mg total) by mouth daily. , Disp: 90 tablet, Rfl: 3    clopidogrel (PLAVIX) 75 MG Oral Tab, Take 1 tablet (75 mg total) by mouth daily. , Disp: 30 tablet, Rfl: 0    clopidogrel 75 MG Oral Tab, Take 1 tablet (75 mg total) by mouth daily. , Disp: 20 tablet, Rfl: 0    atorvastatin 40 MG Oral Tab, Take 1 tablet (40 mg total) by mouth nightly., Disp: 90 tablet, Rfl: 3    amLODIPine 10 MG Oral Tab, Take 1 tablet (10 mg total) by mouth daily. , Disp: 90 tablet, Rfl: 3    metoprolol tartrate 100 MG Oral Tab, Take 1 tablet (100 mg total) by mouth 2 (two) times daily. , Disp: , Rfl:     methylPREDNISolone (MEDROL) 4 MG Oral Tablet Therapy Pack, As directed. (Patient not taking: Reported on 3/7/2023), Disp: 1 each, Rfl: 0    cyclobenzaprine 5 MG Oral Tab, Take 1 tablet (5 mg total) by mouth nightly., Disp: 30 tablet, Rfl: 0    PANTOPRAZOLE 40 MG Oral Tab EC, TAKE 1 TABLET(40 MG) BY MOUTH EVERY MORNING BEFORE BREAKFAST, Disp: 90 tablet, Rfl: 0    multivitamin Oral Tab, Take 1 tablet by mouth daily. , Disp: 30 tablet, Rfl: 0    Cholecalciferol (VITAMIN D3) 25 MCG (1000 UT) Oral Cap, Take 1 tablet by mouth daily. , Disp: , Rfl:     ASPIRIN 81 OR, Take 81 mg by mouth daily. , Disp: , Rfl:     No Known Allergies    ROS:   As in HPI, the rest of the 14 system review was done and was negative      Physical Exam:   07/31/23  1518   BP: 110/62   Weight: 225 lb (102.1 kg)   Height: 69\"       General: No apparent distress, well nourished, well groomed. Head- Normocephalic, atraumatic  Eyes- No redness or swelling  ENT- Hearing intake, normal glutition  Neck- No masses or adenopathy  Cv: pulses were palpable and normal, no cyanosis or edema     Neurological:     Mental Status- Alert and oriented x3.   Normal attention span and concentration  Thought process intact  Memory intact- recent and remote  Mood intact  Fund of knowledge appropriate for education and age    Language intact including: comprehension, naming, repetition, vocabulary    Cranial Nerves:  II.- Visual fields full to confrontation    III, IV, VI- EOM intact, BEBETO  V. Facial sensation intact  VII. Face symmetric, no facial weakness  VIII. Hearing intact to whisper. IX. Pallet elevates symmetrically. XI. Shoulder shrug is intact  XII. Tongue is midline    Motor Exam:  Muscle tone normal  No atrophy or fasciculations  Strength- upper extremities 5/5 proximally and distally                  - lower  extremities 5/5 proximally and distally    Sensory Exam:  Light touch sensation- intact in all 4 extremities      No clonus  No Babinski sign    Coordination:  Finger to nose intact  Rapid alternating movements intact    Gait:  Normal posture  Normal physiologic      Labs:    Lab Results   Component Value Date    TSH 1.150 02/07/2023     Lab Results   Component Value Date    HDL 37 (L) 02/07/2023    LDL 46 02/07/2023    TRIG 83 02/07/2023     Lab Results   Component Value Date    HGB 15.0 05/02/2023    HCT 44.1 05/02/2023    MCV 91.9 05/02/2023    WBC 9.0 05/02/2023    .0 05/02/2023      Lab Results   Component Value Date    BUN 17 05/02/2023    CA 8.7 05/02/2023    ALT 41 02/07/2023    AST 16 02/07/2023    ALKPHOS 44 03/21/2011    ALB 3.9 02/07/2023     05/02/2023    K 3.7 05/02/2023     05/02/2023    CO2 29.0 05/02/2023      I have reviewed labs. Imaging Studies:  I have independently reviewed imaging. MRI of the brain was independent reviewed as above. No acute changes. Assessment   1. TIA (transient ischemic attack)  Possible TIA, possible ischemic optic neuropathy. With his history of carotid stenosis based off of carotid arteries we did CT angiogram, that was not significantly revealing. Therefore we will continue with aspirin, Plavix was already stopped. Holter monitor was not revealing. We discussed weight loss, improvement of exercise and dietary changes. Education and counseling provided to patient.  Instructed patient to call my office or seek medical attention immediately if symptoms worsen. Patient verbalized understanding of information given. All questions were answered. All side effects of drugs were discussed. Return to clinic in: No follow-ups on file.     Janine Lynn MD

## 2023-11-08 ENCOUNTER — MED REC SCAN ONLY (OUTPATIENT)
Dept: INTERNAL MEDICINE CLINIC | Facility: CLINIC | Age: 58
End: 2023-11-08

## 2024-02-05 ENCOUNTER — MED REC SCAN ONLY (OUTPATIENT)
Dept: INTERNAL MEDICINE CLINIC | Facility: CLINIC | Age: 59
End: 2024-02-05

## 2024-03-05 RX ORDER — ATORVASTATIN CALCIUM 40 MG/1
40 TABLET, FILM COATED ORAL NIGHTLY
Qty: 90 TABLET | Refills: 0 | Status: SHIPPED | OUTPATIENT
Start: 2024-03-05

## 2024-03-05 RX ORDER — AMLODIPINE BESYLATE 10 MG/1
10 TABLET ORAL DAILY
Qty: 90 TABLET | Refills: 0 | Status: SHIPPED | OUTPATIENT
Start: 2024-03-05

## 2024-03-05 NOTE — TELEPHONE ENCOUNTER
Please review; protocol failed/No Protocol    LOV: 02/07/2023  Sent [place] to schedule an appointment     No Active/Future labs pended   Requested Prescriptions   Pending Prescriptions Disp Refills    ATORVASTATIN 40 MG Oral Tab [Pharmacy Med Name: ATORVASTATIN 40MG TABLETS] 90 tablet 3     Sig: TAKE 1 TABLET(40 MG) BY MOUTH EVERY NIGHT       Cholesterol Medication Protocol Failed - 3/4/2024  6:03 AM        Failed - ALT < 80     Lab Results   Component Value Date    ALT 41 02/07/2023             Failed - ALT resulted within past year        Failed - Lipid panel within past 12 months     Lab Results   Component Value Date    CHOLEST 100 02/07/2023    TRIG 83 02/07/2023    HDL 37 (L) 02/07/2023    LDL 46 02/07/2023    VLDL 12 02/07/2023    TCHDLRATIO 4.6 11/14/2012    NONHDLC 63 02/07/2023             Failed - In person appointment or virtual visit in the past 12 mos or appointment in next 3 mos     Recent Outpatient Visits              7 months ago TIA (transient ischemic attack)    [facility] Colby Gonzalez MD    Office Visit    10 months ago TIA (transient ischemic attack)    [facility] Colby Kelley MD    Office Visit    11 months ago HARVEY (obstructive sleep apnea)    [facility]    Office Visit    12 months ago Obstructive sleep apnea    [facility] Junior Lisa MD    Office Visit    1 year ago Other fatigue    [facility]    Office Visit                        AMLODIPINE 10 MG Oral Tab [Pharmacy Med Name: AMLODIPINE BESYLATE 10MG TABLETS] 90 tablet 3     Sig: TAKE 1 TABLET(10 MG) BY MOUTH DAILY       Hypertension Medications Protocol Failed - 3/4/2024  6:03 AM        Failed - In person appointment or virtual visit in the past 12 mos or appointment in next 3 mos     Recent Outpatient Visits              7 months ago TIA (transient  ischemic attack)    [facility], Colby Gonzalez MD    Office Visit    10 months ago TIA (transient ischemic attack)    [facility], Colby Joshua MD    Office Visit    11 months ago HARVEY (obstructive sleep apnea)    [facility]    Office Visit    12 months ago Obstructive sleep apnea    [facility] RoweJunior Morales MD    Office Visit    1 year ago Other fatigue    [facility]    Office Visit                      Passed - CMP or BMP in past 12 months        Passed - Last BP reading less than 140/90     BP Readings from Last 1 Encounters:   07/31/23 110/62               Passed - EGFRCR or GFRNAA > 50     GFR Evaluation  EGFRCR: 101 , resulted on 5/2/2023               Recent Outpatient Visits              7 months ago TIA (transient ischemic attack)    [facility], Colby Gonzalez MD    Office Visit    10 months ago TIA (transient ischemic attack)    [facility] Colby Joshua MD    Office Visit    11 months ago HARVEY (obstructive sleep apnea)    [facility]    Office Visit    12 months ago Obstructive sleep apnea    [facility] Junior Mcarthur MD    Office Visit    1 year ago Other fatigue    [facility]    Office Visit

## 2024-03-07 ENCOUNTER — OFFICE VISIT (OUTPATIENT)
Dept: INTERNAL MEDICINE CLINIC | Facility: CLINIC | Age: 59
End: 2024-03-07

## 2024-03-07 ENCOUNTER — HOSPITAL ENCOUNTER (OUTPATIENT)
Dept: ULTRASOUND IMAGING | Facility: HOSPITAL | Age: 59
Discharge: HOME | End: 2024-03-07
Attending: INTERNAL MEDICINE
Payer: COMMERCIAL

## 2024-03-07 VITALS
RESPIRATION RATE: 17 BRPM | HEART RATE: 82 BPM | WEIGHT: 234 LBS | DIASTOLIC BLOOD PRESSURE: 66 MMHG | HEIGHT: 69 IN | SYSTOLIC BLOOD PRESSURE: 114 MMHG | TEMPERATURE: 98 F | BODY MASS INDEX: 34.66 KG/M2 | OXYGEN SATURATION: 99 %

## 2024-03-07 DIAGNOSIS — Z00.00 ENCOUNTER FOR PREVENTIVE CARE: ICD-10-CM

## 2024-03-07 DIAGNOSIS — M25.561 CHRONIC PAIN OF BOTH KNEES: ICD-10-CM

## 2024-03-07 DIAGNOSIS — E78.00 HIGH CHOLESTEROL: ICD-10-CM

## 2024-03-07 DIAGNOSIS — L98.9 SKIN LESIONS: ICD-10-CM

## 2024-03-07 DIAGNOSIS — R00.2 PALPITATIONS: ICD-10-CM

## 2024-03-07 DIAGNOSIS — M47.812 OSTEOARTHRITIS OF CERVICAL SPINE, UNSPECIFIED SPINAL OSTEOARTHRITIS COMPLICATION STATUS: ICD-10-CM

## 2024-03-07 DIAGNOSIS — Z87.891 SMOKING HISTORY: ICD-10-CM

## 2024-03-07 DIAGNOSIS — G89.29 CHRONIC PAIN OF BOTH KNEES: ICD-10-CM

## 2024-03-07 DIAGNOSIS — M25.562 CHRONIC PAIN OF BOTH KNEES: ICD-10-CM

## 2024-03-07 DIAGNOSIS — M79.89 SWELLING OF LEFT EXTREMITY: ICD-10-CM

## 2024-03-07 DIAGNOSIS — Z95.1 S/P CABG X 3: ICD-10-CM

## 2024-03-07 DIAGNOSIS — R73.09 ELEVATED GLUCOSE: ICD-10-CM

## 2024-03-07 DIAGNOSIS — I25.10 CORONARY ARTERY DISEASE INVOLVING NATIVE CORONARY ARTERY OF NATIVE HEART WITHOUT ANGINA PECTORIS: ICD-10-CM

## 2024-03-07 DIAGNOSIS — Z00.00 ANNUAL PHYSICAL EXAM: Primary | ICD-10-CM

## 2024-03-07 DIAGNOSIS — R10.11 RUQ ABDOMINAL PAIN: ICD-10-CM

## 2024-03-07 DIAGNOSIS — G45.9 TIA (TRANSIENT ISCHEMIC ATTACK): ICD-10-CM

## 2024-03-07 DIAGNOSIS — I10 ESSENTIAL HYPERTENSION: ICD-10-CM

## 2024-03-07 DIAGNOSIS — G47.33 OSA (OBSTRUCTIVE SLEEP APNEA): ICD-10-CM

## 2024-03-07 LAB
ALBUMIN SERPL-MCNC: 4.7 G/DL (ref 3.2–4.8)
ALBUMIN/GLOB SERPL: 1.4 {RATIO} (ref 1–2)
ALP LIVER SERPL-CCNC: 64 U/L
ALT SERPL-CCNC: 28 U/L
ANION GAP SERPL CALC-SCNC: 5 MMOL/L (ref 0–18)
AST SERPL-CCNC: 21 U/L (ref ?–34)
BASOPHILS # BLD AUTO: 0.02 X10(3) UL (ref 0–0.2)
BASOPHILS NFR BLD AUTO: 0.4 %
BILIRUB SERPL-MCNC: 0.9 MG/DL (ref 0.3–1.2)
BILIRUB UR QL: NEGATIVE
BUN BLD-MCNC: 11 MG/DL (ref 9–23)
BUN/CREAT SERPL: 13.4 (ref 10–20)
CALCIUM BLD-MCNC: 9.7 MG/DL (ref 8.7–10.4)
CHLORIDE SERPL-SCNC: 106 MMOL/L (ref 98–112)
CHOLEST SERPL-MCNC: 113 MG/DL (ref ?–200)
CLARITY UR: CLEAR
CO2 SERPL-SCNC: 27 MMOL/L (ref 21–32)
COLOR UR: COLORLESS
COMPLEXED PSA SERPL-MCNC: 0.66 NG/ML (ref ?–4)
CREAT BLD-MCNC: 0.82 MG/DL
DEPRECATED RDW RBC AUTO: 44.1 FL (ref 35.1–46.3)
EGFRCR SERPLBLD CKD-EPI 2021: 101 ML/MIN/1.73M2 (ref 60–?)
EOSINOPHIL # BLD AUTO: 0.07 X10(3) UL (ref 0–0.7)
EOSINOPHIL NFR BLD AUTO: 1.3 %
ERYTHROCYTE [DISTWIDTH] IN BLOOD BY AUTOMATED COUNT: 13 % (ref 11–15)
FASTING PATIENT LIPID ANSWER: YES
FASTING STATUS PATIENT QL REPORTED: YES
GLOBULIN PLAS-MCNC: 3.3 G/DL (ref 2.8–4.4)
GLUCOSE BLD-MCNC: 91 MG/DL (ref 70–99)
GLUCOSE UR-MCNC: NORMAL MG/DL
HCT VFR BLD AUTO: 46 %
HDLC SERPL-MCNC: 39 MG/DL (ref 40–59)
HGB BLD-MCNC: 15.9 G/DL
HGB UR QL STRIP.AUTO: NEGATIVE
IMM GRANULOCYTES # BLD AUTO: 0 X10(3) UL (ref 0–1)
IMM GRANULOCYTES NFR BLD: 0 %
KETONES UR-MCNC: NEGATIVE MG/DL
LDLC SERPL CALC-MCNC: 60 MG/DL (ref ?–100)
LEUKOCYTE ESTERASE UR QL STRIP.AUTO: NEGATIVE
LYMPHOCYTES # BLD AUTO: 1.49 X10(3) UL (ref 1–4)
LYMPHOCYTES NFR BLD AUTO: 27.1 %
MCH RBC QN AUTO: 32.2 PG (ref 26–34)
MCHC RBC AUTO-ENTMCNC: 34.6 G/DL (ref 31–37)
MCV RBC AUTO: 93.1 FL
MONOCYTES # BLD AUTO: 0.37 X10(3) UL (ref 0.1–1)
MONOCYTES NFR BLD AUTO: 6.7 %
NEUTROPHILS # BLD AUTO: 3.55 X10 (3) UL (ref 1.5–7.7)
NEUTROPHILS # BLD AUTO: 3.55 X10(3) UL (ref 1.5–7.7)
NEUTROPHILS NFR BLD AUTO: 64.5 %
NITRITE UR QL STRIP.AUTO: NEGATIVE
NONHDLC SERPL-MCNC: 74 MG/DL (ref ?–130)
OSMOLALITY SERPL CALC.SUM OF ELEC: 285 MOSM/KG (ref 275–295)
PH UR: 6.5 [PH] (ref 5–8)
PLATELET # BLD AUTO: 237 10(3)UL (ref 150–450)
POTASSIUM SERPL-SCNC: 4.1 MMOL/L (ref 3.5–5.1)
PROT SERPL-MCNC: 8 G/DL (ref 5.7–8.2)
PROT UR-MCNC: NEGATIVE MG/DL
RBC # BLD AUTO: 4.94 X10(6)UL
SODIUM SERPL-SCNC: 138 MMOL/L (ref 136–145)
SP GR UR STRIP: 1 (ref 1–1.03)
TRIGL SERPL-MCNC: 64 MG/DL (ref 30–149)
TSI SER-ACNC: 0.77 MIU/ML (ref 0.55–4.78)
UROBILINOGEN UR STRIP-ACNC: NORMAL
VLDLC SERPL CALC-MCNC: 9 MG/DL (ref 0–30)
WBC # BLD AUTO: 5.5 X10(3) UL (ref 4–11)

## 2024-03-07 PROCEDURE — 3008F BODY MASS INDEX DOCD: CPT | Performed by: INTERNAL MEDICINE

## 2024-03-07 PROCEDURE — 3074F SYST BP LT 130 MM HG: CPT | Performed by: INTERNAL MEDICINE

## 2024-03-07 PROCEDURE — 36415 COLL VENOUS BLD VENIPUNCTURE: CPT | Performed by: INTERNAL MEDICINE

## 2024-03-07 PROCEDURE — 93971 EXTREMITY STUDY: CPT | Performed by: INTERNAL MEDICINE

## 2024-03-07 PROCEDURE — 3078F DIAST BP <80 MM HG: CPT | Performed by: INTERNAL MEDICINE

## 2024-03-07 RX ORDER — PANTOPRAZOLE SODIUM 40 MG/1
40 TABLET, DELAYED RELEASE ORAL
Qty: 30 TABLET | Refills: 1 | Status: SHIPPED | OUTPATIENT
Start: 2024-03-07 | End: 2024-03-08

## 2024-03-08 ENCOUNTER — TELEPHONE (OUTPATIENT)
Dept: INTERNAL MEDICINE CLINIC | Facility: CLINIC | Age: 59
End: 2024-03-08

## 2024-03-08 DIAGNOSIS — M65.312 TRIGGER FINGER OF LEFT THUMB: Primary | ICD-10-CM

## 2024-03-08 LAB
EST. AVERAGE GLUCOSE BLD GHB EST-MCNC: 128 MG/DL (ref 68–126)
HBA1C MFR BLD: 6.1 % (ref ?–5.7)

## 2024-03-08 RX ORDER — PANTOPRAZOLE SODIUM 40 MG/1
40 TABLET, DELAYED RELEASE ORAL
Qty: 90 TABLET | Refills: 0 | Status: SHIPPED | OUTPATIENT
Start: 2024-03-08

## 2024-03-08 NOTE — TELEPHONE ENCOUNTER
While going over his lab results, patient stated he told the MA when he was here for an appointment that he was having a problem with trigger finger affecting his left thumb.  This problem was not addressed during his appointment due to numerous other concerns.  He is asking Dr Ruiz if he should try an antiinflammation medication?    Advised him to make an appointment but he wanted to run this by Dr Ruiz.  Please advise?

## 2024-03-08 NOTE — TELEPHONE ENCOUNTER
Refill passed per [facility] protocol.  New prescription, please advise on quantity of 90 per patient request. Thank you.   .  Requested Prescriptions   Pending Prescriptions Disp Refills    PANTOPRAZOLE 40 MG Oral Tab EC [Pharmacy Med Name: PANTOPRAZOLE 40MG TABLETS] 90 tablet 0     Sig: TAKE 1 TABLET(40 MG) BY MOUTH BEFORE BREAKFAST       Gastrointestional Medication Protocol Passed - 3/7/2024  5:55 PM        Passed - In person appointment or virtual visit in the past 12 mos or appointment in next 3 mos     Recent Outpatient Visits              [facility]    [facility] Visit    7 months ago TIA (transient ischemic attack)    [facility] [place] Gonzalez MD    Office Visit    10 months ago TIA (transient ischemic attack)    [facility]    Office Visit    11 months ago HARVEY (obstructive sleep apnea)    [facility]    Office Visit    1 year ago Obstructive sleep apnea    [facility]    [facility] Visit                           Recent Outpatient Visits              Yesterday Annual physical exam    [facility] [place]    Office Visit    7 months ago TIA (transient ischemic attack)    [facility] [place] Gonzalez MD    Office Visit    10 months ago TIA (transient ischemic attack)    [facility] [place]    Office Visit    11 months ago HARVEY (obstructive sleep apnea)    [facility]    Office Visit    1 year ago Obstructive sleep apnea    [facility] [place]    Office Visit

## 2024-03-08 NOTE — TELEPHONE ENCOUNTER
Refill passed per [facility] protocol.  New prescription, please advise on quantity of 90 per patient request. Thank you.  .  Requested Prescriptions   Pending Prescriptions Disp Refills    PANTOPRAZOLE 40 MG Oral Tab EC [Pharmacy Med Name: PANTOPRAZOLE 40MG TABLETS] 90 tablet 0     Sig: TAKE 1 TABLET(40 MG) BY MOUTH BEFORE BREAKFAST       Gastrointestional Medication Protocol Passed - 3/7/2024  5:55 PM        Passed - In person appointment or virtual visit in the past 12 mos or appointment in next 3 mos     Recent Outpatient Visits              [facility]    7 months ago TIA (transient ischemic attack)    [facility] Gonzalez [facility]    Office Visit    10 months ago TIA (transient ischemic attack)    [facility]    Office Visit    11 months ago HARVEY (obstructive sleep apnea)    [facility]    Office Visit    1 year ago Obstructive sleep apnea    [facility]                           Recent Outpatient Visits              Yesterday Annual physical exam    [facility] [place]    Office Visit    7 months ago TIA (transient ischemic attack)    [facility] [place] Gonzalez MD    Office Visit    10 months ago TIA (transient ischemic attack)    [facility] [place]    Office Visit    11 months ago HARVEY (obstructive sleep apnea)    [facility]    Office Visit    1 year ago Obstructive sleep apnea    [facility] [place]    Office Visit

## 2024-03-09 NOTE — TELEPHONE ENCOUNTER
Unfortunately anti-inflammatories will not help much about trigger fingers so he will need to see a hand specialist who can do an injection of steroids which can help your otherwise can try to do some hand exercises like squeezing  a ball , I will place the referral for hand specialist if he is interested in seeing a specialist

## 2024-03-09 NOTE — TELEPHONE ENCOUNTER
Gave patient instructions from Dr Ruiz.  He does want the refill for hand specialist for his left thumb problem.  Patient notified of referral, but he is driving at present. Information sent to his MyChart.

## 2024-03-11 ENCOUNTER — HOSPITAL ENCOUNTER (OUTPATIENT)
Dept: ULTRASOUND IMAGING | Age: 59
Discharge: HOME | End: 2024-03-11
Attending: INTERNAL MEDICINE
Payer: COMMERCIAL

## 2024-03-11 DIAGNOSIS — R10.11 RUQ ABDOMINAL PAIN: ICD-10-CM

## 2024-03-11 PROCEDURE — 76705 ECHO EXAM OF ABDOMEN: CPT | Performed by: INTERNAL MEDICINE

## 2024-03-11 NOTE — PROGRESS NOTES
Subjective:     Patient ID: Willam Pereyra is a 58 year old male.    HPI    Patient comes in for annual exam, has few complaints, left lower ex /ankle swelling chronic ever since he had vein removed for cabg, no pain no redness. Pt also with complaints of irregular heart beats when he  checks the bp the monitor tells him, seen cards told him he has PVC . Pt also today with complaint of ruq abd pain on ad off stabbing pain dose  not last, food does not really affected much or he is not sure.   Pt also with complaint of some skin rash and lesions     History/Other:   Review of Systems   Constitutional: Negative.    HENT: Negative.     Eyes: Negative.    Respiratory: Negative.     Cardiovascular:  Positive for palpitations and leg swelling.   Gastrointestinal:  Positive for abdominal pain.   Genitourinary: Negative.    Musculoskeletal: Negative.    Skin:  Positive for rash.   Neurological: Negative.    Psychiatric/Behavioral: Negative.       Current Outpatient Medications   Medication Sig Dispense Refill    atorvastatin 40 MG Oral Tab Take 1 tablet (40 mg total) by mouth nightly. 90 tablet 0    amLODIPine 10 MG Oral Tab Take 1 tablet (10 mg total) by mouth daily. 90 tablet 0    lisinopril 2.5 MG Oral Tab Take 1 tablet (2.5 mg total) by mouth daily. 90 tablet 3    metoprolol tartrate 100 MG Oral Tab Take 1 tablet (100 mg total) by mouth 2 (two) times daily.      cyclobenzaprine 5 MG Oral Tab Take 1 tablet (5 mg total) by mouth nightly. 30 tablet 0    multivitamin Oral Tab Take 1 tablet by mouth daily. 30 tablet 0    Cholecalciferol (VITAMIN D3) 25 MCG (1000 UT) Oral Cap Take 1 tablet by mouth daily.      ASPIRIN 81 OR Take 81 mg by mouth daily.      pantoprazole 40 MG Oral Tab EC Take 1 tablet (40 mg total) by mouth before breakfast. 90 tablet 0     Allergies:No Known Allergies    Past Medical History:   Diagnosis Date    Coronary atherosclerosis     Essential hypertension 2/14/2017    High blood pressure     High  cholesterol       No past surgical history on file.   Family History   Problem Relation Age of Onset    Prostate Cancer Father     Prostate Cancer Paternal Uncle     Diabetes Maternal Grandfather     Hypertension Mother       Social History:   Social History     Socioeconomic History    Marital status: Single   Tobacco Use    Smoking status: Former     Packs/day: 0.50     Years: 30.00     Additional pack years: 0.00     Total pack years: 15.00     Types: Cigarettes     Quit date: 2/1/2021     Years since quitting: 3.1    Smokeless tobacco: Never   Vaping Use    Vaping Use: Never used   Substance and Sexual Activity    Alcohol use: No     Alcohol/week: 0.0 standard drinks of alcohol    Drug use: No        Objective:   Physical Exam  Vitals and nursing note reviewed.   Constitutional:       Appearance: He is well-developed.   HENT:      Head: Normocephalic and atraumatic.      Right Ear: External ear normal.      Left Ear: External ear normal.      Nose: Nose normal.   Eyes:      Conjunctiva/sclera: Conjunctivae normal.      Pupils: Pupils are equal, round, and reactive to light.   Cardiovascular:      Rate and Rhythm: Normal rate and regular rhythm.      Heart sounds: Normal heart sounds.   Pulmonary:      Effort: Pulmonary effort is normal.      Breath sounds: Normal breath sounds.   Abdominal:      General: Bowel sounds are normal.      Palpations: Abdomen is soft.      Comments: On exam no abd pain, no guarding    Genitourinary:     Penis: Normal.       Prostate: Normal.      Rectum: Normal.   Musculoskeletal:         General: No tenderness. Normal range of motion.      Cervical back: Normal range of motion and neck supple.      Left lower leg: Edema present.   Skin:     General: Skin is warm and dry.   Neurological:      Mental Status: He is alert and oriented to person, place, and time.      Deep Tendon Reflexes: Reflexes are normal and symmetric.         Assessment & Plan:   1. Annual physical exam as above  will order labs    2. TIA (transient ischemic attack) - med managements    3. S/P CABG x 3 - stable follows with cards    4. Elevated glucose - will chak a1c   5. Essential hypertension - well controlled    6. Coronary artery disease involving native coronary artery of native heart without angina pectoris - med management follows with cardiology   7. Swelling of left extremity - ? Possibly due to vein removal, will order us to r.o dvt to be sure    8. RUQ abdominal pain - ? Possible gastritis, will start ppi, follow with gi, will order us    9. Palpitations - probably pvc, sees cardiology   10. Skin lesions - will refer to derm    11. Encounter for preventive care - follow with gi for screening colonoscopy    12. Osteoarthritis of cervical spine, unspecified spinal osteoarthritis complication status - med management    13. Smoking history - up to date with ct lung screening    14. HARVEY (obstructive sleep apnea) - uses cpap    15. Chronic pain of both knees -a s need med for pain            Orders Placed This Encounter   Procedures    CBC With Differential With Platelet    Comp Metabolic Panel (14)    TSH W Reflex To Free T4    Lipid Panel    Urinalysis, Routine    PSA Total, Screen    Hemoglobin A1C       Meds This Visit:  Requested Prescriptions      No prescriptions requested or ordered in this encounter       Imaging & Referrals:  DERM - INTERNAL  OP REFERRAL TO [facility] GI TELEPHONE COLON SCREEN

## 2024-06-04 RX ORDER — ATORVASTATIN CALCIUM 40 MG/1
40 TABLET, FILM COATED ORAL NIGHTLY
Qty: 90 TABLET | Refills: 3 | Status: SHIPPED | OUTPATIENT
Start: 2024-06-04

## 2024-06-04 RX ORDER — AMLODIPINE BESYLATE 10 MG/1
10 TABLET ORAL DAILY
Qty: 90 TABLET | Refills: 3 | Status: SHIPPED | OUTPATIENT
Start: 2024-06-04

## 2024-06-04 RX ORDER — LISINOPRIL 2.5 MG/1
2.5 TABLET ORAL DAILY
Qty: 90 TABLET | Refills: 3 | Status: SHIPPED | OUTPATIENT
Start: 2024-06-04

## 2024-06-04 NOTE — TELEPHONE ENCOUNTER
Refill passed per protocol.    Requested Prescriptions   Pending Prescriptions Disp Refills    ATORVASTATIN 40 MG Oral Tab [Pharmacy Med Name: ATORVASTATIN 40MG TABLETS] 90 tablet 0     Sig: TAKE 1 TABLET(40 MG) BY MOUTH EVERY NIGHT       Cholesterol Medication Protocol Passed - 6/1/2024  6:01 AM        Passed - ALT < 80     Lab Results   Component Value Date    ALT 28 03/07/2024             Passed - ALT resulted within past year        Passed - Lipid panel within past 12 months     Lab Results   Component Value Date    CHOLEST 113 03/07/2024    TRIG 64 03/07/2024    HDL 39 (L) 03/07/2024    LDL 60 03/07/2024    VLDL 9 03/07/2024    TCHDLRATIO 4.6 11/14/2012    NONHDLC 74 03/07/2024             Passed - In person appointment or virtual visit in the past 12 mos or appointment in next 3 mos     Recent Outpatient Visits              2 months ago Annual physical exam    [facility] Joaquin Ruiz MD    Office Visit    10 months ago TIA (transient ischemic attack)    [facility]by Gonzalez MD    Office Visit    1 year ago TIA (transient ischemic attack)    [facility] Colby Kelley MD    Office Visit    1 year ago HARVEY (obstructive sleep apnea)    [facility]    Office Visit    1 year ago Obstructive sleep apnea    [facility] [place] Junior Lisa MD    Office Visit                        LISINOPRIL 2.5 MG Oral Tab [Pharmacy Med Name: LISINOPRIL 2.5MG TABLETS] 90 tablet 3     Sig: TAKE 1 TABLET(2.5 MG) BY MOUTH DAILY       Hypertension Medications Protocol Passed - 6/1/2024  6:01 AM        Passed - CMP or BMP in past 12 months        Passed - Last BP reading less than 140/90     BP Readings from Last 1 Encounters:   03/07/24 114/66               Passed - In person appointment or virtual visit in the past 12 mos or appointment  in next 3 mos     Recent Outpatient Visits              2 months ago Annual physical exam    [facility] [place]    Office Visit    10 months ago TIA (transient ischemic attack)    [facility] Colby Gonzalez MD    Office Visit    1 year ago TIA (transient ischemic attack)    [facility] [place]by Joshua MD    Office Visit    1 year ago HARVEY (obstructive sleep apnea)    [facility]    Office Visit    1 year ago Obstructive sleep apnea    [facility] Junior Mcarthur MD    Office Visit                      Passed - EGFRCR or GFRNAA > 50     GFR Evaluation  EGFRCR: 101 , resulted on 3/7/2024            AMLODIPINE 10 MG Oral Tab [Pharmacy Med Name: AMLODIPINE BESYLATE 10MG TABLETS] 90 tablet 0     Sig: TAKE 1 TABLET(10 MG) BY MOUTH DAILY       Hypertension Medications Protocol Passed - 6/1/2024  6:01 AM        Passed - CMP or BMP in past 12 months        Passed - Last BP reading less than 140/90     BP Readings from Last 1 Encounters:   03/07/24 114/66               Passed - In person appointment or virtual visit in the past 12 mos or appointment in next 3 mos     Recent Outpatient Visits              2 months ago Annual physical exam    [facility] Joaquin Plascencia MD    Office Visit    10 months ago TIA (transient ischemic attack)    [facility] Colby Gonzalez MD    Office Visit    1 year ago TIA (transient ischemic attack)    [facility] Colby Joshua MD    Office Visit    1 year ago HARVEY (obstructive sleep apnea)    [facility]    Office Visit    1 year ago Obstructive sleep apnea    [facility] Junior Mcarthur MD     Office Visit                      Passed - EGFRCR or GFRNAA > 50     GFR Evaluation  EGFRCR: 101 , resulted on 3/7/2024                 Recent Outpatient Visits              2 months ago Annual physical exam    [facility] Joaquin Ruiz [facility]    Office Visit    10 months ago TIA (transient ischemic attack)    [facility] Colby Gonzalez MD    Office Visit    1 year ago TIA (transient ischemic attack)    [facility] [place] [facility]    Office Visit    1 year ago HARVEY (obstructive sleep apnea)    [facility]    Office Visit    1 year ago Obstructive sleep apnea    [facility]    Office Visit

## 2024-08-07 ENCOUNTER — TELEPHONE (OUTPATIENT)
Dept: INTERNAL MEDICINE CLINIC | Facility: CLINIC | Age: 59
End: 2024-08-07

## 2024-08-26 ENCOUNTER — TELEPHONE (OUTPATIENT)
Dept: INTERNAL MEDICINE CLINIC | Facility: CLINIC | Age: 59
End: 2024-08-26

## 2024-08-26 DIAGNOSIS — Z00.00 ENCOUNTER FOR PREVENTIVE CARE: Primary | ICD-10-CM

## 2024-08-26 NOTE — TELEPHONE ENCOUNTER
Patient was calling  that he is due for a colon rectal screening . Kit was given to patient needs order for to take back to the hospital

## 2024-09-04 ENCOUNTER — LAB ENCOUNTER (OUTPATIENT)
Dept: LAB | Age: 59
End: 2024-09-04
Attending: INTERNAL MEDICINE
Payer: COMMERCIAL

## 2024-12-01 ENCOUNTER — TELEPHONE (OUTPATIENT)
Dept: INTERNAL MEDICINE CLINIC | Facility: CLINIC | Age: 59
End: 2024-12-01

## 2024-12-03 RX ORDER — PANTOPRAZOLE SODIUM 40 MG/1
40 TABLET, DELAYED RELEASE ORAL
Qty: 90 TABLET | Refills: 0 | Status: SHIPPED | OUTPATIENT
Start: 2024-12-03

## 2024-12-03 NOTE — TELEPHONE ENCOUNTER
Refill passed per [facility] protocol.    Last office visit 3/7/24  Last RX 3/8/24  # 90 with no refills      Requested Prescriptions   Pending Prescriptions Disp Refills    PANTOPRAZOLE 40 MG Oral Tab EC [Pharmacy Med Name: PANTOPRAZOLE 40MG TABLETS] 90 tablet 0     Sig: TAKE 1 TABLET(40 MG) BY MOUTH BEFORE BREAKFAST       Gastrointestional Medication Protocol Passed - 12/3/2024  4:15 PM        Passed - In person appointment or virtual visit in the past 12 mos or appointment in next 3 mos     Recent Outpatient Visits              9 months ago Annual physical exam    [facility]    Office Visit    1 year ago TIA (transient ischemic attack)    [facility] Colby Gonzalez MD    Office Visit    1 year ago TIA (transient ischemic attack)    [facility] Colby Joshua MD    Office Visit    1 year ago HARVEY (obstructive sleep apnea)    [facility]    Office Visit    1 year ago Obstructive sleep apnea    [facility] Junior Mcarthur MD    Office Visit                           Recent Outpatient Visits              9 months ago Annual physical exam    [facility], Joaquin Plascencia MD    Office Visit    1 year ago TIA (transient ischemic attack)    [facility] Colby Gonzalez MD    Office Visit    1 year ago TIA (transient ischemic attack)    [facility] Colby Joshua MD    Office Visit    1 year ago HARVEY (obstructive sleep apnea)    [facility]    Office Visit    1 year ago Obstructive sleep apnea    [facility] Junior Mcarthur MD    Office Visit

## 2024-12-05 ENCOUNTER — OFFICE VISIT (OUTPATIENT)
Dept: INTERNAL MEDICINE CLINIC | Facility: CLINIC | Age: 59
End: 2024-12-05

## 2024-12-05 VITALS
HEART RATE: 76 BPM | RESPIRATION RATE: 17 BRPM | DIASTOLIC BLOOD PRESSURE: 64 MMHG | BODY MASS INDEX: 34.51 KG/M2 | TEMPERATURE: 98 F | SYSTOLIC BLOOD PRESSURE: 106 MMHG | OXYGEN SATURATION: 98 % | WEIGHT: 233 LBS | HEIGHT: 69 IN

## 2024-12-05 DIAGNOSIS — G89.29 CHRONIC PAIN OF LEFT KNEE: ICD-10-CM

## 2024-12-05 DIAGNOSIS — R10.32 LLQ ABDOMINAL PAIN: Primary | ICD-10-CM

## 2024-12-05 DIAGNOSIS — M79.671 FOOT PAIN, RIGHT: ICD-10-CM

## 2024-12-05 DIAGNOSIS — G47.33 OSA ON CPAP: ICD-10-CM

## 2024-12-05 DIAGNOSIS — M25.562 CHRONIC PAIN OF LEFT KNEE: ICD-10-CM

## 2024-12-05 DIAGNOSIS — T14.8XXA BRUISE: ICD-10-CM

## 2024-12-05 PROCEDURE — 90471 IMMUNIZATION ADMIN: CPT | Performed by: INTERNAL MEDICINE

## 2024-12-05 PROCEDURE — 3008F BODY MASS INDEX DOCD: CPT | Performed by: INTERNAL MEDICINE

## 2024-12-05 PROCEDURE — 3074F SYST BP LT 130 MM HG: CPT | Performed by: INTERNAL MEDICINE

## 2024-12-05 PROCEDURE — 3078F DIAST BP <80 MM HG: CPT | Performed by: INTERNAL MEDICINE

## 2024-12-05 PROCEDURE — 90656 IIV3 VACC NO PRSV 0.5 ML IM: CPT | Performed by: INTERNAL MEDICINE

## 2024-12-05 PROCEDURE — 99214 OFFICE O/P EST MOD 30 MIN: CPT | Performed by: INTERNAL MEDICINE

## 2024-12-05 NOTE — TELEPHONE ENCOUNTER
Last read by Willam Pereyra at  7:03 PM on 12/4/2024.   Patient saw [employer] message sent--->see above    No future appointments.    Patient  - please call patient to assist with scheduling advised visit [see note below]

## 2024-12-07 ENCOUNTER — HOSPITAL ENCOUNTER (OUTPATIENT)
Dept: GENERAL RADIOLOGY | Age: 59
Discharge: HOME | End: 2024-12-07
Attending: INTERNAL MEDICINE
Payer: COMMERCIAL

## 2024-12-07 DIAGNOSIS — M79.671 FOOT PAIN, RIGHT: ICD-10-CM

## 2024-12-07 DIAGNOSIS — M25.562 CHRONIC PAIN OF LEFT KNEE: ICD-10-CM

## 2024-12-07 DIAGNOSIS — G89.29 CHRONIC PAIN OF LEFT KNEE: ICD-10-CM

## 2024-12-07 PROCEDURE — 73562 X-RAY EXAM OF KNEE 3: CPT | Performed by: INTERNAL MEDICINE

## 2024-12-07 PROCEDURE — 73630 X-RAY EXAM OF FOOT: CPT | Performed by: INTERNAL MEDICINE

## 2024-12-08 NOTE — PROGRESS NOTES
Subjective:     Patient ID: Willam Pereyra is a 59 year old male.    Patient comes in today with multiple complaints has been having abdominal Pain on/off pain for the past month. Occasionally on/off nausea and diarrhea, the pain is on the left lower quadrant has been having a daily  Patient also today with complaint of foot Pain (R-foot, feels like there is something on the bottom of the foot.    Also says she is noted bruising On the back of R-thigh. Happened a couple of months back now resolved  No pain,  Patient has been using CPAP Machine - he is still using it, but has noticed increased nightmares and feels like he is not getting the same results  Patient saw eye specialist and was given referral to see a different specialist needs to follow-up    Patient also with complaint of left knee Pain this is chronic. No injury        History/Other:   Review of Systems   Gastrointestinal:  Positive for abdominal pain and nausea.        Llq abd pain   Musculoskeletal:  Positive for arthralgias.        Rt foot and lef tknee pain     Current Outpatient Medications   Medication Sig Dispense Refill    pantoprazole 40 MG Oral Tab EC Take 1 tablet (40 mg total) by mouth before breakfast. 90 tablet 0    atorvastatin 40 MG Oral Tab Take 1 tablet (40 mg total) by mouth nightly. 90 tablet 3    lisinopril 2.5 MG Oral Tab Take 1 tablet (2.5 mg total) by mouth daily. 90 tablet 3    amLODIPine 10 MG Oral Tab Take 1 tablet (10 mg total) by mouth daily. 90 tablet 3    metoprolol tartrate 100 MG Oral Tab Take 1 tablet (100 mg total) by mouth 2 (two) times daily.      cyclobenzaprine 5 MG Oral Tab Take 1 tablet (5 mg total) by mouth nightly. 30 tablet 0    multivitamin Oral Tab Take 1 tablet by mouth daily. 30 tablet 0    Cholecalciferol (VITAMIN D3) 25 MCG (1000 UT) Oral Cap Take 1 tablet by mouth daily.      ASPIRIN 81 OR Take 81 mg by mouth daily.       Allergies:Allergies[1]    Past Medical History:    Coronary atherosclerosis     Essential hypertension    High blood pressure    High cholesterol      No past surgical history on file.   Family History   Problem Relation Age of Onset    Prostate Cancer Father     Prostate Cancer Paternal Uncle     Diabetes Maternal Grandfather     Hypertension Mother       Social History:   Social History     Socioeconomic History    Marital status: Single   Tobacco Use    Smoking status: Former     Current packs/day: 0.00     Average packs/day: 0.5 packs/day for 30.0 years (15.0 ttl pk-yrs)     Types: Cigarettes     Start date: 2/1/1991     Quit date: 2/1/2021     Years since quitting: 3.8     Passive exposure: Never    Smokeless tobacco: Never   Vaping Use    Vaping status: Never Used   Substance and Sexual Activity    Alcohol use: No     Alcohol/week: 0.0 standard drinks of alcohol    Drug use: No        Objective:   Physical Exam  Constitutional:       Appearance: He is well-developed.   HENT:      Head: Normocephalic and atraumatic.      Right Ear: External ear normal.      Left Ear: External ear normal.      Nose: Nose normal.   Eyes:      Conjunctiva/sclera: Conjunctivae normal.      Pupils: Pupils are equal, round, and reactive to light.   Cardiovascular:      Rate and Rhythm: Normal rate and regular rhythm.      Heart sounds: Normal heart sounds.   Pulmonary:      Effort: Pulmonary effort is normal.      Breath sounds: Normal breath sounds.   Abdominal:      General: Bowel sounds are normal. There is distension.      Palpations: Abdomen is soft.      Tenderness: There is abdominal tenderness.   Genitourinary:     Penis: Normal.       Prostate: Normal.      Rectum: Normal.   Musculoskeletal:         General: Normal range of motion.      Cervical back: Normal range of motion and neck supple.   Skin:     General: Skin is warm and dry.   Neurological:      Mental Status: He is alert and oriented to person, place, and time.      Deep Tendon Reflexes: Reflexes are normal and symmetric.         Assessment &  Plan:   1. LLQ abdominal pain ?  Has been going on for almost a month.  Today will route CT to rule out colitis or other pathology any worsening symptoms to let us know may need to see GI if CT unrevealing    2. Foot pain, right we will order an x-ray will follow results   3. Bruise probably muscle sprain with small tear resolved monitor   4. HARVEY on CPAP will refer to pulmonary   5. Chronic pain of left knee we will get an x-ray will refer to Ortho       Orders Placed This Encounter   Procedures    Fluzone trivalent vaccine, PF 0.5mL, 6mo+ (34471)       Meds This Visit:  Requested Prescriptions      No prescriptions requested or ordered in this encounter       Imaging & Referrals:  PULMONARY - INTERNAL  ORTHOPEDIC - INTERNAL  INFLUENZA VACCINE, TRI, PRESERV FREE, 0.5 ML  CT ABDOMEN+PELVIS(CPT=74176)            [1] No Known Allergies

## 2024-12-09 DIAGNOSIS — M79.671 RIGHT FOOT PAIN: Primary | ICD-10-CM

## 2024-12-11 ENCOUNTER — HOSPITAL ENCOUNTER (OUTPATIENT)
Dept: CT IMAGING | Facility: HOSPITAL | Age: 59
Discharge: HOME | End: 2024-12-11
Attending: INTERNAL MEDICINE
Payer: COMMERCIAL

## 2024-12-11 DIAGNOSIS — R10.32 LLQ ABDOMINAL PAIN: ICD-10-CM

## 2024-12-11 PROCEDURE — 74176 CT ABD & PELVIS W/O CONTRAST: CPT | Performed by: INTERNAL MEDICINE

## 2024-12-13 DIAGNOSIS — N20.0 KIDNEY STONE: Primary | ICD-10-CM

## 2024-12-13 DIAGNOSIS — N40.0 ENLARGED PROSTATE: ICD-10-CM

## 2025-01-08 ENCOUNTER — TELEPHONE (OUTPATIENT)
Dept: INTERNAL MEDICINE CLINIC | Facility: CLINIC | Age: 60
End: 2025-01-08

## 2025-01-08 DIAGNOSIS — H57.9 EYE PROBLEM: Primary | ICD-10-CM

## 2025-01-08 NOTE — TELEPHONE ENCOUNTER
Patient has an appointment for consultation next week with [facility] [place] for [place]ik  Requesting for a referral.  New insurance per Patient. Call was transferred to  to update insurance information.  Referral pended. Fax [phone]  Please sign if approved.  Last office visit 12/5/24

## 2025-02-24 ENCOUNTER — OFFICE VISIT (OUTPATIENT)
Dept: INTERNAL MEDICINE CLINIC | Facility: CLINIC | Age: 60
End: 2025-02-24

## 2025-02-24 ENCOUNTER — TELEPHONE (OUTPATIENT)
Dept: INTERNAL MEDICINE CLINIC | Facility: CLINIC | Age: 60
End: 2025-02-24

## 2025-02-24 VITALS
RESPIRATION RATE: 18 BRPM | DIASTOLIC BLOOD PRESSURE: 86 MMHG | HEIGHT: 69 IN | OXYGEN SATURATION: 95 % | WEIGHT: 234 LBS | TEMPERATURE: 98 F | SYSTOLIC BLOOD PRESSURE: 157 MMHG | BODY MASS INDEX: 34.66 KG/M2 | HEART RATE: 75 BPM

## 2025-02-24 DIAGNOSIS — Z87.891 SMOKING HISTORY: ICD-10-CM

## 2025-02-24 DIAGNOSIS — R10.13 EPIGASTRIC PAIN: Primary | ICD-10-CM

## 2025-02-24 LAB
ALBUMIN SERPL-MCNC: 4.8 G/DL (ref 3.2–4.8)
ALBUMIN/GLOB SERPL: 1.5 {RATIO} (ref 1–2)
ALP LIVER SERPL-CCNC: 70 U/L
ALT SERPL-CCNC: 33 U/L
ANION GAP SERPL CALC-SCNC: 8 MMOL/L (ref 0–18)
AST SERPL-CCNC: 24 U/L (ref ?–34)
BASOPHILS # BLD AUTO: 0.03 X10(3) UL (ref 0–0.2)
BASOPHILS NFR BLD AUTO: 0.4 %
BILIRUB SERPL-MCNC: 0.7 MG/DL (ref 0.2–1.1)
BUN BLD-MCNC: 14 MG/DL (ref 9–23)
BUN/CREAT SERPL: 16.1 (ref 10–20)
CALCIUM BLD-MCNC: 9 MG/DL (ref 8.7–10.4)
CHLORIDE SERPL-SCNC: 101 MMOL/L (ref 98–112)
CO2 SERPL-SCNC: 27 MMOL/L (ref 21–32)
CREAT BLD-MCNC: 0.87 MG/DL
DEPRECATED RDW RBC AUTO: 42.3 FL (ref 35.1–46.3)
EGFRCR SERPLBLD CKD-EPI 2021: 99 ML/MIN/1.73M2 (ref 60–?)
EOSINOPHIL # BLD AUTO: 0.06 X10(3) UL (ref 0–0.7)
EOSINOPHIL NFR BLD AUTO: 0.8 %
ERYTHROCYTE [DISTWIDTH] IN BLOOD BY AUTOMATED COUNT: 12.9 % (ref 11–15)
FASTING STATUS PATIENT QL REPORTED: YES
GLOBULIN PLAS-MCNC: 3.2 G/DL (ref 2–3.5)
GLUCOSE BLD-MCNC: 112 MG/DL (ref 70–99)
HCT VFR BLD AUTO: 46.3 %
HGB BLD-MCNC: 16 G/DL
IMM GRANULOCYTES # BLD AUTO: 0.01 X10(3) UL (ref 0–1)
IMM GRANULOCYTES NFR BLD: 0.1 %
LIPASE SERPL-CCNC: 43 U/L (ref 12–53)
LYMPHOCYTES # BLD AUTO: 1.26 X10(3) UL (ref 1–4)
LYMPHOCYTES NFR BLD AUTO: 17 %
MCH RBC QN AUTO: 31.3 PG (ref 26–34)
MCHC RBC AUTO-ENTMCNC: 34.6 G/DL (ref 31–37)
MCV RBC AUTO: 90.4 FL
MONOCYTES # BLD AUTO: 0.45 X10(3) UL (ref 0.1–1)
MONOCYTES NFR BLD AUTO: 6.1 %
NEUTROPHILS # BLD AUTO: 5.6 X10 (3) UL (ref 1.5–7.7)
NEUTROPHILS # BLD AUTO: 5.6 X10(3) UL (ref 1.5–7.7)
NEUTROPHILS NFR BLD AUTO: 75.6 %
OSMOLALITY SERPL CALC.SUM OF ELEC: 283 MOSM/KG (ref 275–295)
PLATELET # BLD AUTO: 223 10(3)UL (ref 150–450)
POTASSIUM SERPL-SCNC: 4.1 MMOL/L (ref 3.5–5.1)
PROT SERPL-MCNC: 8 G/DL (ref 5.7–8.2)
RBC # BLD AUTO: 5.12 X10(6)UL
SODIUM SERPL-SCNC: 136 MMOL/L (ref 136–145)
WBC # BLD AUTO: 7.4 X10(3) UL (ref 4–11)

## 2025-02-24 PROCEDURE — 36415 COLL VENOUS BLD VENIPUNCTURE: CPT | Performed by: INTERNAL MEDICINE

## 2025-02-24 PROCEDURE — 99214 OFFICE O/P EST MOD 30 MIN: CPT | Performed by: INTERNAL MEDICINE

## 2025-02-24 PROCEDURE — 3008F BODY MASS INDEX DOCD: CPT | Performed by: INTERNAL MEDICINE

## 2025-02-24 PROCEDURE — 3077F SYST BP >= 140 MM HG: CPT | Performed by: INTERNAL MEDICINE

## 2025-02-24 PROCEDURE — 3079F DIAST BP 80-89 MM HG: CPT | Performed by: INTERNAL MEDICINE

## 2025-02-24 RX ORDER — FAMOTIDINE 20 MG/1
TABLET, FILM COATED ORAL
COMMUNITY
Start: 2025-02-23

## 2025-02-24 NOTE — TELEPHONE ENCOUNTER
Pt stated he was seen at [facility] Saturday  for abdominal pain, was advised ER, stated did not go , pain goes and comes, had diarrhea stool, pain in upper abdomen today, f/u appt made , no intense pain when walking

## 2025-02-24 NOTE — PROGRESS NOTES
Subjective:     Patient ID: Willam Pereyra is a 59 year old male.      Patient comes in today for follow-up of that he was seen at the urgent care with abdominal pain he had been walking on the treadmill felt little shaky thought his sugar was low so he went to the fridge had some cabinets with some spicy sauce and then started having midepigastric abdominal pain immediate care says on examination they noted that he was also tender right lower quadrant so he was told he needs to go to ER for ruling out appendix patient never went.  I have seen patient in December for some abdominal pain and at that time we did a CT scan which was normal except there was a left kidney stone nonobstructing.  Denies any urinary symptoms denies any flank pain right now is feeling much better he does have some midepigastric abdominal discomfort but is eating fine no problem patient is taking Protonix and famotidine.  Denies any chest pain or shortness of breath        History/Other:   Review of Systems   Constitutional: Negative.  Negative for fatigue and fever.   HENT: Negative.  Negative for congestion.    Eyes: Negative.    Respiratory: Negative.  Negative for cough, shortness of breath and wheezing.    Cardiovascular: Negative.  Negative for chest pain, palpitations and leg swelling.   Gastrointestinal:  Positive for abdominal pain.   Endocrine: Negative for cold intolerance and heat intolerance.   Genitourinary: Negative.  Negative for dysuria, flank pain and hematuria.   Musculoskeletal: Negative.  Negative for arthralgias, back pain and myalgias.   Skin: Negative.    Neurological: Negative.  Negative for dizziness, tremors, syncope, weakness and headaches.   Psychiatric/Behavioral: Negative.  Negative for agitation, behavioral problems and suicidal ideas. The patient is not nervous/anxious.      Current Outpatient Medications   Medication Sig Dispense Refill    famotidine 20 MG Oral Tab TAKE 2 TABLETS BY MOUTH DAILY ON AN EMPTY  STOMACH FOR 7 DAYS AS NEEDED FOR HEARTBURN      pantoprazole 40 MG Oral Tab EC Take 1 tablet (40 mg total) by mouth before breakfast. 90 tablet 0    atorvastatin 40 MG Oral Tab Take 1 tablet (40 mg total) by mouth nightly. 90 tablet 3    lisinopril 2.5 MG Oral Tab Take 1 tablet (2.5 mg total) by mouth daily. 90 tablet 3    amLODIPine 10 MG Oral Tab Take 1 tablet (10 mg total) by mouth daily. 90 tablet 3    metoprolol tartrate 100 MG Oral Tab Take 1 tablet (100 mg total) by mouth 2 (two) times daily.      cyclobenzaprine 5 MG Oral Tab Take 1 tablet (5 mg total) by mouth nightly. 30 tablet 0    multivitamin Oral Tab Take 1 tablet by mouth daily. 30 tablet 0    Cholecalciferol (VITAMIN D3) 25 MCG (1000 UT) Oral Cap Take 1 tablet by mouth daily.      ASPIRIN 81 OR Take 81 mg by mouth daily.       Allergies:Allergies[1]    Past Medical History:    Coronary atherosclerosis    Essential hypertension    High blood pressure    High cholesterol      No past surgical history on file.   Family History   Problem Relation Age of Onset    Prostate Cancer Father     Prostate Cancer Paternal Uncle     Diabetes Maternal Grandfather     Hypertension Mother       Social History:   Social History     Socioeconomic History    Marital status: Single   Tobacco Use    Smoking status: Former     Current packs/day: 0.00     Average packs/day: 0.5 packs/day for 30.0 years (15.0 ttl pk-yrs)     Types: Cigarettes     Start date: 1991     Quit date: 2021     Years since quittin.0     Passive exposure: Never    Smokeless tobacco: Never   Vaping Use    Vaping status: Never Used   Substance and Sexual Activity    Alcohol use: No     Alcohol/week: 0.0 standard drinks of alcohol    Drug use: No        Objective:   Physical Exam  Vitals and nursing note reviewed.   Constitutional:       Appearance: He is well-developed.   HENT:      Head: Normocephalic and atraumatic.      Right Ear: External ear normal.      Left Ear: External ear normal.       Nose: Nose normal.   Eyes:      Conjunctiva/sclera: Conjunctivae normal.      Pupils: Pupils are equal, round, and reactive to light.   Cardiovascular:      Rate and Rhythm: Normal rate and regular rhythm.      Heart sounds: Normal heart sounds.   Pulmonary:      Effort: Pulmonary effort is normal.      Breath sounds: Normal breath sounds.   Abdominal:      General: Bowel sounds are normal. There is no distension.      Palpations: Abdomen is soft.      Tenderness: There is abdominal tenderness.      Comments: Mild epigastric area pain    Genitourinary:     Penis: Normal.       Prostate: Normal.      Rectum: Normal.   Musculoskeletal:         General: Normal range of motion.      Cervical back: Normal range of motion and neck supple.   Skin:     General: Skin is warm and dry.   Neurological:      Mental Status: He is alert and oriented to person, place, and time.      Deep Tendon Reflexes: Reflexes are normal and symmetric.         Assessment & Plan:   1. Epigastric pain -? Cause possible gastritis on ppi and h2 blocker will order labs lipase CMP will follow results monitor symptoms if not better or worse to let us know   2. Smoking history will order CT lungs       Orders Placed This Encounter   Procedures    CBC With Differential With Platelet    Comp Metabolic Panel (14)    Lipase [E]       Meds This Visit:  Requested Prescriptions      No prescriptions requested or ordered in this encounter       Imaging & Referrals:  CT LUNG LD SCREENING(CPT=71271)            [1] No Known Allergies

## 2025-02-25 ENCOUNTER — TELEPHONE (OUTPATIENT)
Dept: INTERNAL MEDICINE CLINIC | Facility: CLINIC | Age: 60
End: 2025-02-25

## 2025-02-25 NOTE — TELEPHONE ENCOUNTER
On Call    Patient called at 1:25 in the morning.    He got out of bed to go to the bathroom and was dizzy.    He took his blood pressure and it was 150/90.  He is not dizzy now.    Instructed to see his provider in the morning or [facility] provider.  To take blood pressure.    Follow up in the morning- NO answer    Simran Adams DNP  Working with

## 2025-03-03 RX ORDER — PANTOPRAZOLE SODIUM 40 MG/1
40 TABLET, DELAYED RELEASE ORAL
Qty: 90 TABLET | Refills: 3 | Status: SHIPPED | OUTPATIENT
Start: 2025-03-03

## 2025-03-03 NOTE — TELEPHONE ENCOUNTER
Refill passed [facility] protocol.    Please review pended refill request as unable to refill due to High / Very High drug interaction or warning copied here:Medium  Duplicate Therapy: famotidine, pantoprazolePEPTIC ULCER AGENTS. No Abuse/Dependency Potential.

## 2025-03-04 ENCOUNTER — HOSPITAL ENCOUNTER (OUTPATIENT)
Dept: CT IMAGING | Facility: HOSPITAL | Age: 60
Discharge: HOME | End: 2025-03-04
Attending: INTERNAL MEDICINE
Payer: COMMERCIAL

## 2025-03-04 DIAGNOSIS — Z87.891 SMOKING HISTORY: ICD-10-CM

## 2025-03-04 PROCEDURE — 71271 CT THORAX LUNG CANCER SCR C-: CPT | Performed by: INTERNAL MEDICINE

## 2025-06-02 RX ORDER — ATORVASTATIN CALCIUM 40 MG/1
40 TABLET, FILM COATED ORAL NIGHTLY
Qty: 90 TABLET | Refills: 3 | Status: SHIPPED | OUTPATIENT
Start: 2025-06-02

## 2025-06-02 RX ORDER — LISINOPRIL 2.5 MG/1
2.5 TABLET ORAL DAILY
Qty: 90 TABLET | Refills: 3 | Status: SHIPPED | OUTPATIENT
Start: 2025-06-02

## 2025-06-02 RX ORDER — AMLODIPINE BESYLATE 10 MG/1
10 TABLET ORAL DAILY
Qty: 90 TABLET | Refills: 3 | Status: SHIPPED | OUTPATIENT
Start: 2025-06-02

## 2025-07-31 ENCOUNTER — OFFICE VISIT (OUTPATIENT)
Dept: INTERNAL MEDICINE CLINIC | Facility: CLINIC | Age: 60
End: 2025-07-31

## 2025-07-31 VITALS
OXYGEN SATURATION: 93 % | TEMPERATURE: 98 F | WEIGHT: 245 LBS | SYSTOLIC BLOOD PRESSURE: 130 MMHG | HEIGHT: 69 IN | HEART RATE: 84 BPM | BODY MASS INDEX: 36.29 KG/M2 | DIASTOLIC BLOOD PRESSURE: 66 MMHG | RESPIRATION RATE: 18 BRPM

## 2025-07-31 DIAGNOSIS — M25.562 CHRONIC PAIN OF BOTH KNEES: ICD-10-CM

## 2025-07-31 DIAGNOSIS — G89.29 CHRONIC PAIN OF MULTIPLE JOINTS: ICD-10-CM

## 2025-07-31 DIAGNOSIS — Z95.1 S/P CABG X 3: ICD-10-CM

## 2025-07-31 DIAGNOSIS — G89.29 CHRONIC PAIN OF BOTH KNEES: ICD-10-CM

## 2025-07-31 DIAGNOSIS — Z00.00 ANNUAL PHYSICAL EXAM: Primary | ICD-10-CM

## 2025-07-31 DIAGNOSIS — M25.50 CHRONIC PAIN OF MULTIPLE JOINTS: ICD-10-CM

## 2025-07-31 DIAGNOSIS — G47.33 OSA ON CPAP: ICD-10-CM

## 2025-07-31 DIAGNOSIS — E78.00 HIGH CHOLESTEROL: ICD-10-CM

## 2025-07-31 DIAGNOSIS — I10 ESSENTIAL HYPERTENSION: ICD-10-CM

## 2025-07-31 DIAGNOSIS — G89.29 CHRONIC BILATERAL THORACIC BACK PAIN: ICD-10-CM

## 2025-07-31 DIAGNOSIS — Z00.00 ENCOUNTER FOR PREVENTIVE CARE: ICD-10-CM

## 2025-07-31 DIAGNOSIS — M79.671 BILATERAL FOOT PAIN: ICD-10-CM

## 2025-07-31 DIAGNOSIS — M25.561 CHRONIC PAIN OF BOTH KNEES: ICD-10-CM

## 2025-07-31 DIAGNOSIS — G89.29 CHRONIC BILATERAL LOW BACK PAIN WITH LEFT-SIDED SCIATICA: ICD-10-CM

## 2025-07-31 DIAGNOSIS — M79.672 BILATERAL FOOT PAIN: ICD-10-CM

## 2025-07-31 DIAGNOSIS — I25.10 CORONARY ARTERY DISEASE INVOLVING NATIVE CORONARY ARTERY OF NATIVE HEART WITHOUT ANGINA PECTORIS: ICD-10-CM

## 2025-07-31 DIAGNOSIS — M54.6 CHRONIC BILATERAL THORACIC BACK PAIN: ICD-10-CM

## 2025-07-31 DIAGNOSIS — G89.29 NECK PAIN, CHRONIC: ICD-10-CM

## 2025-07-31 DIAGNOSIS — M54.2 NECK PAIN, CHRONIC: ICD-10-CM

## 2025-07-31 DIAGNOSIS — M54.42 CHRONIC BILATERAL LOW BACK PAIN WITH LEFT-SIDED SCIATICA: ICD-10-CM

## 2025-07-31 PROCEDURE — 3075F SYST BP GE 130 - 139MM HG: CPT | Performed by: INTERNAL MEDICINE

## 2025-07-31 PROCEDURE — 3078F DIAST BP <80 MM HG: CPT | Performed by: INTERNAL MEDICINE

## 2025-07-31 PROCEDURE — 99396 PREV VISIT EST AGE 40-64: CPT | Performed by: INTERNAL MEDICINE

## 2025-07-31 PROCEDURE — 3008F BODY MASS INDEX DOCD: CPT | Performed by: INTERNAL MEDICINE

## 2025-07-31 RX ORDER — METHYLPREDNISOLONE 4 MG/1
TABLET ORAL
Qty: 1 EACH | Refills: 0 | Status: SHIPPED | OUTPATIENT
Start: 2025-07-31

## 2025-07-31 RX ORDER — CYCLOBENZAPRINE HCL 5 MG
5 TABLET ORAL NIGHTLY PRN
Qty: 30 TABLET | Refills: 0 | Status: SHIPPED | OUTPATIENT
Start: 2025-07-31

## 2025-08-04 ENCOUNTER — HOSPITAL ENCOUNTER (OUTPATIENT)
Dept: GENERAL RADIOLOGY | Age: 60
Discharge: HOME | End: 2025-08-04
Attending: INTERNAL MEDICINE

## 2025-08-04 ENCOUNTER — LAB ENCOUNTER (OUTPATIENT)
Dept: LAB | Age: 60
End: 2025-08-04
Attending: INTERNAL MEDICINE

## 2025-08-04 ENCOUNTER — RESULTS FOLLOW-UP (OUTPATIENT)
Dept: INTERNAL MEDICINE CLINIC | Facility: CLINIC | Age: 60
End: 2025-08-04

## 2025-08-04 DIAGNOSIS — R73.09 ELEVATED GLUCOSE: ICD-10-CM

## 2025-08-04 DIAGNOSIS — M25.562 CHRONIC PAIN OF BOTH KNEES: ICD-10-CM

## 2025-08-04 DIAGNOSIS — G89.29 CHRONIC PAIN OF MULTIPLE JOINTS: ICD-10-CM

## 2025-08-04 DIAGNOSIS — E78.00 HIGH CHOLESTEROL: ICD-10-CM

## 2025-08-04 DIAGNOSIS — M54.42 CHRONIC BILATERAL LOW BACK PAIN WITH LEFT-SIDED SCIATICA: ICD-10-CM

## 2025-08-04 DIAGNOSIS — G89.29 NECK PAIN, CHRONIC: ICD-10-CM

## 2025-08-04 DIAGNOSIS — I10 ESSENTIAL HYPERTENSION: ICD-10-CM

## 2025-08-04 DIAGNOSIS — Z00.00 ANNUAL PHYSICAL EXAM: ICD-10-CM

## 2025-08-04 DIAGNOSIS — M79.672 BILATERAL FOOT PAIN: ICD-10-CM

## 2025-08-04 DIAGNOSIS — G89.29 CHRONIC BILATERAL LOW BACK PAIN WITH LEFT-SIDED SCIATICA: ICD-10-CM

## 2025-08-04 DIAGNOSIS — G89.29 CHRONIC PAIN OF BOTH KNEES: ICD-10-CM

## 2025-08-04 DIAGNOSIS — G47.33 OSA ON CPAP: ICD-10-CM

## 2025-08-04 DIAGNOSIS — M79.671 BILATERAL FOOT PAIN: ICD-10-CM

## 2025-08-04 DIAGNOSIS — G89.29 CHRONIC BILATERAL THORACIC BACK PAIN: ICD-10-CM

## 2025-08-04 DIAGNOSIS — M54.6 CHRONIC BILATERAL THORACIC BACK PAIN: ICD-10-CM

## 2025-08-04 DIAGNOSIS — M25.561 CHRONIC PAIN OF BOTH KNEES: ICD-10-CM

## 2025-08-04 DIAGNOSIS — R73.09 ELEVATED GLUCOSE: Primary | ICD-10-CM

## 2025-08-04 DIAGNOSIS — M25.50 CHRONIC PAIN OF MULTIPLE JOINTS: ICD-10-CM

## 2025-08-04 DIAGNOSIS — I25.10 CORONARY ARTERY DISEASE INVOLVING NATIVE CORONARY ARTERY OF NATIVE HEART WITHOUT ANGINA PECTORIS: ICD-10-CM

## 2025-08-04 DIAGNOSIS — M54.2 NECK PAIN, CHRONIC: ICD-10-CM

## 2025-08-04 LAB
ALBUMIN SERPL-MCNC: 4.7 G/DL (ref 3.2–4.8)
ALBUMIN/GLOB SERPL: 1.5 (ref 1–2)
ALP LIVER SERPL-CCNC: 76 U/L (ref 45–117)
ALT SERPL-CCNC: 36 U/L (ref 10–49)
ANION GAP SERPL CALC-SCNC: 11 MMOL/L (ref 0–18)
AST SERPL-CCNC: 23 U/L (ref ?–34)
BASOPHILS # BLD AUTO: 0.03 X10(3) UL (ref 0–0.2)
BASOPHILS NFR BLD AUTO: 0.4 %
BILIRUB SERPL-MCNC: 1 MG/DL (ref 0.2–1.1)
BILIRUB UR QL: NEGATIVE
BUN BLD-MCNC: 14 MG/DL (ref 9–23)
BUN/CREAT SERPL: 14.9 (ref 10–20)
CALCIUM BLD-MCNC: 9.1 MG/DL (ref 8.7–10.4)
CHLORIDE SERPL-SCNC: 100 MMOL/L (ref 98–112)
CHOLEST SERPL-MCNC: 117 MG/DL (ref ?–200)
CLARITY UR: CLEAR
CO2 SERPL-SCNC: 27 MMOL/L (ref 21–32)
COLOR UR: YELLOW
COMPLEXED PSA SERPL-MCNC: 0.51 NG/ML (ref ?–4)
CREAT BLD-MCNC: 0.94 MG/DL (ref 0.7–1.3)
DEPRECATED RDW RBC AUTO: 45 FL (ref 35.1–46.3)
EGFRCR SERPLBLD CKD-EPI 2021: 92 ML/MIN/1.73M2 (ref 60–?)
EOSINOPHIL # BLD AUTO: 0.09 X10(3) UL (ref 0–0.7)
EOSINOPHIL NFR BLD AUTO: 1.2 %
ERYTHROCYTE [DISTWIDTH] IN BLOOD BY AUTOMATED COUNT: 13.3 % (ref 11–15)
FASTING PATIENT LIPID ANSWER: YES
FASTING STATUS PATIENT QL REPORTED: YES
GLOBULIN PLAS-MCNC: 3.1 G/DL (ref 2–3.5)
GLUCOSE BLD-MCNC: 136 MG/DL (ref 70–99)
GLUCOSE UR-MCNC: NORMAL MG/DL
HCT VFR BLD AUTO: 45.1 % (ref 39–53)
HDLC SERPL-MCNC: 39 MG/DL (ref 40–59)
HGB BLD-MCNC: 15.4 G/DL (ref 13–17.5)
HGB UR QL STRIP.AUTO: NEGATIVE
IMM GRANULOCYTES # BLD AUTO: 0.02 X10(3) UL (ref 0–1)
IMM GRANULOCYTES NFR BLD: 0.3 %
KETONES UR-MCNC: NEGATIVE MG/DL
LDLC SERPL CALC-MCNC: 60 MG/DL (ref ?–100)
LEUKOCYTE ESTERASE UR QL STRIP.AUTO: NEGATIVE
LYMPHOCYTES # BLD AUTO: 1.62 X10(3) UL (ref 1–4)
LYMPHOCYTES NFR BLD AUTO: 22.4 %
MCH RBC QN AUTO: 31.1 PG (ref 26–34)
MCHC RBC AUTO-ENTMCNC: 34.1 G/DL (ref 31–37)
MCV RBC AUTO: 91.1 FL (ref 80–100)
MONOCYTES # BLD AUTO: 0.54 X10(3) UL (ref 0.1–1)
MONOCYTES NFR BLD AUTO: 7.5 %
NEUTROPHILS # BLD AUTO: 4.92 X10 (3) UL (ref 1.5–7.7)
NEUTROPHILS # BLD AUTO: 4.92 X10(3) UL (ref 1.5–7.7)
NEUTROPHILS NFR BLD AUTO: 68.2 %
NITRITE UR QL STRIP.AUTO: NEGATIVE
NONHDLC SERPL-MCNC: 78 MG/DL (ref ?–130)
OSMOLALITY SERPL CALC.SUM OF ELEC: 289 MOSM/KG (ref 275–295)
PH UR: 6.5 (ref 5–8)
PLATELET # BLD AUTO: 223 10(3)UL (ref 150–450)
POTASSIUM SERPL-SCNC: 3.9 MMOL/L (ref 3.5–5.1)
PROT SERPL-MCNC: 7.8 G/DL (ref 5.7–8.2)
PROT UR-MCNC: NEGATIVE MG/DL
RBC # BLD AUTO: 4.95 X10(6)UL (ref 4.3–5.7)
SODIUM SERPL-SCNC: 138 MMOL/L (ref 136–145)
SP GR UR STRIP: 1.02 (ref 1–1.03)
TRIGL SERPL-MCNC: 91 MG/DL (ref 30–149)
TSI SER-ACNC: 0.96 UIU/ML (ref 0.55–4.78)
UROBILINOGEN UR STRIP-ACNC: NORMAL
VLDLC SERPL CALC-MCNC: 13 MG/DL (ref 0–30)
WBC # BLD AUTO: 7.2 X10(3) UL (ref 4–11)

## 2025-08-04 PROCEDURE — 80053 COMPREHEN METABOLIC PANEL: CPT

## 2025-08-04 PROCEDURE — 36415 COLL VENOUS BLD VENIPUNCTURE: CPT

## 2025-08-04 PROCEDURE — 81003 URINALYSIS AUTO W/O SCOPE: CPT

## 2025-08-04 PROCEDURE — 73560 X-RAY EXAM OF KNEE 1 OR 2: CPT | Performed by: INTERNAL MEDICINE

## 2025-08-04 PROCEDURE — 85025 COMPLETE CBC W/AUTO DIFF WBC: CPT

## 2025-08-04 PROCEDURE — 84443 ASSAY THYROID STIM HORMONE: CPT

## 2025-08-04 PROCEDURE — 80061 LIPID PANEL: CPT

## 2025-08-04 PROCEDURE — 72050 X-RAY EXAM NECK SPINE 4/5VWS: CPT | Performed by: INTERNAL MEDICINE

## 2025-08-04 PROCEDURE — 83036 HEMOGLOBIN GLYCOSYLATED A1C: CPT

## 2025-08-05 LAB
EST. AVERAGE GLUCOSE BLD GHB EST-MCNC: 131 MG/DL (ref 68–126)
HBA1C MFR BLD: 6.2 % (ref ?–5.7)

## 2025-08-08 ENCOUNTER — MED REC SCAN ONLY (OUTPATIENT)
Dept: INTERNAL MEDICINE CLINIC | Facility: CLINIC | Age: 60
End: 2025-08-08

## 2025-08-30 ENCOUNTER — APPOINTMENT (OUTPATIENT)
Dept: CT IMAGING | Facility: HOSPITAL | Age: 60
End: 2025-08-30
Attending: EMERGENCY MEDICINE

## 2025-08-30 ENCOUNTER — HOSPITAL ENCOUNTER (EMERGENCY)
Facility: HOSPITAL | Age: 60
Discharge: HOME | End: 2025-08-30
Attending: EMERGENCY MEDICINE

## 2025-08-30 ENCOUNTER — APPOINTMENT (OUTPATIENT)
Dept: GENERAL RADIOLOGY | Facility: HOSPITAL | Age: 60
End: 2025-08-30
Attending: EMERGENCY MEDICINE

## 2025-08-30 VITALS
DIASTOLIC BLOOD PRESSURE: 73 MMHG | RESPIRATION RATE: 18 BRPM | SYSTOLIC BLOOD PRESSURE: 117 MMHG | HEART RATE: 84 BPM | TEMPERATURE: 99 F | OXYGEN SATURATION: 96 %

## 2025-08-30 DIAGNOSIS — J18.9 COMMUNITY ACQUIRED PNEUMONIA, UNSPECIFIED LATERALITY: Primary | ICD-10-CM

## 2025-08-30 LAB
ANION GAP SERPL CALC-SCNC: 5 MMOL/L (ref 0–18)
BASOPHILS # BLD AUTO: 0.01 X10(3) UL (ref 0–0.2)
BASOPHILS NFR BLD AUTO: 0.2 %
BUN BLD-MCNC: 11 MG/DL (ref 9–23)
BUN/CREAT SERPL: 14.1 (ref 10–20)
CALCIUM BLD-MCNC: 9.1 MG/DL (ref 8.7–10.4)
CHLORIDE SERPL-SCNC: 108 MMOL/L (ref 98–112)
CO2 SERPL-SCNC: 26 MMOL/L (ref 21–32)
CREAT BLD-MCNC: 0.78 MG/DL (ref 0.7–1.3)
D DIMER PPP FEU-MCNC: 0.79 UG/ML FEU (ref ?–0.61)
DEPRECATED RDW RBC AUTO: 45 FL (ref 35.1–46.3)
EGFRCR SERPLBLD CKD-EPI 2021: 101 ML/MIN/1.73M2 (ref 60–?)
EOSINOPHIL # BLD AUTO: 0.08 X10(3) UL (ref 0–0.7)
EOSINOPHIL NFR BLD AUTO: 1.9 %
ERYTHROCYTE [DISTWIDTH] IN BLOOD BY AUTOMATED COUNT: 13.2 % (ref 11–15)
GLUCOSE BLD-MCNC: 117 MG/DL (ref 70–99)
HCT VFR BLD AUTO: 42.7 % (ref 39–53)
HGB BLD-MCNC: 15.1 G/DL (ref 13–17.5)
IMM GRANULOCYTES # BLD AUTO: 0.01 X10(3) UL (ref 0–1)
IMM GRANULOCYTES NFR BLD: 0.2 %
LYMPHOCYTES # BLD AUTO: 1.04 X10(3) UL (ref 1–4)
LYMPHOCYTES NFR BLD AUTO: 24.3 %
MCH RBC QN AUTO: 32.3 PG (ref 26–34)
MCHC RBC AUTO-ENTMCNC: 35.4 G/DL (ref 31–37)
MCV RBC AUTO: 91.4 FL (ref 80–100)
MONOCYTES # BLD AUTO: 0.37 X10(3) UL (ref 0.1–1)
MONOCYTES NFR BLD AUTO: 8.6 %
NEUTROPHILS # BLD AUTO: 2.77 X10 (3) UL (ref 1.5–7.7)
NEUTROPHILS # BLD AUTO: 2.77 X10(3) UL (ref 1.5–7.7)
NEUTROPHILS NFR BLD AUTO: 64.8 %
NT-PROBNP SERPL-MCNC: <35 PG/ML (ref ?–125)
OSMOLALITY SERPL CALC.SUM OF ELEC: 288 MOSM/KG (ref 275–295)
PLATELET # BLD AUTO: 197 10(3)UL (ref 150–450)
POTASSIUM SERPL-SCNC: 3.5 MMOL/L (ref 3.5–5.1)
RBC # BLD AUTO: 4.67 X10(6)UL (ref 4.3–5.7)
SODIUM SERPL-SCNC: 139 MMOL/L (ref 136–145)
TROPONIN I SERPL HS-MCNC: <3 NG/L (ref ?–53)
WBC # BLD AUTO: 4.3 X10(3) UL (ref 4–11)

## 2025-08-30 PROCEDURE — 85379 FIBRIN DEGRADATION QUANT: CPT | Performed by: EMERGENCY MEDICINE

## 2025-08-30 PROCEDURE — 71260 CT THORAX DX C+: CPT | Performed by: EMERGENCY MEDICINE

## 2025-08-30 PROCEDURE — 83880 ASSAY OF NATRIURETIC PEPTIDE: CPT | Performed by: EMERGENCY MEDICINE

## 2025-08-30 PROCEDURE — 93005 ELECTROCARDIOGRAM TRACING: CPT

## 2025-08-30 PROCEDURE — 80048 BASIC METABOLIC PNL TOTAL CA: CPT | Performed by: EMERGENCY MEDICINE

## 2025-08-30 PROCEDURE — 84484 ASSAY OF TROPONIN QUANT: CPT | Performed by: EMERGENCY MEDICINE

## 2025-08-30 PROCEDURE — 85025 COMPLETE CBC W/AUTO DIFF WBC: CPT | Performed by: EMERGENCY MEDICINE

## 2025-08-30 PROCEDURE — 71045 X-RAY EXAM CHEST 1 VIEW: CPT | Performed by: EMERGENCY MEDICINE

## 2025-08-30 RX ORDER — DOXYCYCLINE 100 MG/1
100 CAPSULE ORAL 2 TIMES DAILY
Qty: 14 CAPSULE | Refills: 0 | Status: SHIPPED | OUTPATIENT
Start: 2025-08-30 | End: 2025-09-06

## 2025-08-30 RX ORDER — ALBUTEROL SULFATE 90 UG/1
2 INHALANT RESPIRATORY (INHALATION) EVERY 4 HOURS PRN
Qty: 1 EACH | Refills: 0 | Status: SHIPPED | OUTPATIENT
Start: 2025-08-30 | End: 2025-09-29

## 2025-08-30 RX ORDER — DEXAMETHASONE 4 MG/1
8 TABLET ORAL
Qty: 6 TABLET | Refills: 0 | Status: SHIPPED | OUTPATIENT
Start: 2025-08-30 | End: 2025-09-02

## 2025-08-31 LAB
ATRIAL RATE: 91 BPM
P AXIS: 24 DEGREES
P-R INTERVAL: 256 MS
Q-T INTERVAL: 376 MS
QRS DURATION: 92 MS
QTC CALCULATION (BEZET): 462 MS
R AXIS: -26 DEGREES
T AXIS: 5 DEGREES
VENTRICULAR RATE: 91 BPM

## (undated) DEVICE — SUTURE SILK 2-0 SA85H

## (undated) DEVICE — SUTURE PROLENE 7-0 8701H

## (undated) DEVICE — Device

## (undated) DEVICE — SUTURE SILK 2-0 SH

## (undated) DEVICE — SUTURE PROLENE 5-0 8325H

## (undated) DEVICE — CARTRIDGE HC HMS+ CRTDG SYR

## (undated) DEVICE — 32 FR STRAIGHT – SOFT PVC CATHETER: Brand: PVC THORACIC CATHETERS

## (undated) DEVICE — SUTURE PROLENE 4-0 RB-1

## (undated) DEVICE — CS5/5+ FASTPACK, 225ML 150U RES: Brand: HAEMONETICS CELL SAVER 5/5+ SYSTEMS

## (undated) DEVICE — TRAY CATH BDX 16FR 350ML FL

## (undated) DEVICE — SYSTEM ZDRIVE NLTX DISPOSABLE

## (undated) DEVICE — RETROGRADE CARDIOPLEGIA CATHETER: Brand: EDWARDS LIFESCIENCES RETROGRADE CARDIOPLEGIA CATHETER

## (undated) DEVICE — MINI-BLADE®: Brand: BEAVER®

## (undated) DEVICE — SUTURE MONOCRYL 3-0 Y936H

## (undated) DEVICE — Device: Brand: VIRTUOSAPH PLUS WITH RADIAL INDICATION

## (undated) DEVICE — [HIGH FLOW INSUFFLATOR,  DO NOT USE IF PACKAGE IS DAMAGED,  KEEP DRY,  KEEP AWAY FROM SUNLIGHT,  PROTECT FROM HEAT AND RADIOACTIVE SOURCES.]: Brand: PNEUMOSURE

## (undated) DEVICE — SUTURE VICRYL 1-0 J977H

## (undated) DEVICE — EZ GLIDE AORTIC CANNULA: Brand: EDWARDS LIFESCIENCES EZ GLIDE AORTIC CANNULA

## (undated) DEVICE — CONNECTOR PRFSN QCK PRM .25IN

## (undated) DEVICE — DRAPE SLUSH/WARMER W/DISC

## (undated) DEVICE — SUTURE NON ABS 3-0 30INL MONO

## (undated) DEVICE — LEAD BIPOLAR TEMP 6495XF53

## (undated) DEVICE — STERILE TETRA-FLEX CF, ELASTIC BANDAGE, 4" X 5.5YD: Brand: TETRA-FLEX™CF

## (undated) DEVICE — UNDYED BRAIDED (POLYGLACTIN 910), SYNTHETIC ABSORBABLE SUTURE: Brand: COATED VICRYL

## (undated) DEVICE — PAD PLMM SLVR 12.5X4IN SLVR

## (undated) DEVICE — 12 ML SYRINGE LUER-LOCK TIP: Brand: MONOJECT

## (undated) DEVICE — SUTURE PROLENE 6-0 C-1

## (undated) DEVICE — SUTURE SILK 1-0 SA87G

## (undated) DEVICE — FLOSEAL HEMOSTATIC MATRIX, 5ML: Brand: FLOSEAL HEMOSTATIC MATRIX

## (undated) DEVICE — CANNULA PRFSN 15IN 32/40FR .5

## (undated) DEVICE — PACK CUSTOM TUBING

## (undated) DEVICE — 3M™ BAIR HUGGER® UNDERBODY BLANKET, FULL ACCESS, 10 PER CASE 63500: Brand: BAIR HUGGER™

## (undated) DEVICE — CATH THORACIC 32F 5 EYLT

## (undated) DEVICE — STERILE (10.2 X 147CM) TELESCOPICALLY-FOLDED COVER: Brand: CIV-FLEX™ TRANSDUCER COVER

## (undated) DEVICE — ADAPTER CRDPLG ANTGRD RTRGD 3W

## (undated) DEVICE — DEVICE BLWR/MSTR ACCUMIST ATCH

## (undated) DEVICE — SUTURE SILK 4-0 SA63H

## (undated) DEVICE — PACK ASSRY CUSTOM TUBING

## (undated) DEVICE — CATH SECURING DEVICE STATLOCK

## (undated) DEVICE — SOL  .9 1000ML BAG

## (undated) DEVICE — PUNCH AORT 4MM 8IN ROT BLT TIP

## (undated) DEVICE — ABSORBABLE HEMOSTAT (OXIDIZED REGENERATED CELLULOSE, U.S.P.): Brand: SURGICEL

## (undated) DEVICE — SUTURE ETHIBOND 0 CT-1

## (undated) DEVICE — HEART A: Brand: MEDLINE INDUSTRIES, INC.

## (undated) DEVICE — STERILE TETRA-FLEX CF, ELASTIC BANDAGE LATEX FREE 6IN X5.5 YD: Brand: TETRA-FLEX™CF

## (undated) DEVICE — SUCTION CANISTER, 3000CC,SAFELINER: Brand: DEROYAL

## (undated) DEVICE — TRAY ENDOVEIN KTV16 HARVESTING

## (undated) DEVICE — HEART DRAPE AND SUPPLY: Brand: MEDLINE INDUSTRIES, INC.

## (undated) DEVICE — SUTURE SURGICAL STEEL #7

## (undated) DEVICE — INSERT SUTURE OPEN HEART

## (undated) DEVICE — 32 FR RIGHT ANGLE – SOFT PVC CATHETER: Brand: PVC THORACIC CATHETERS

## (undated) DEVICE — SUTURE PDS II 2-0 CT-1

## (undated) DEVICE — ELEC DEFIB QUIK COMBO

## (undated) DEVICE — GAMMEX® PI HYBRID SIZE 8, STERILE POWDER-FREE SURGICAL GLOVE, POLYISOPRENE AND NEOPRENE BLEND: Brand: GAMMEX

## (undated) DEVICE — SUTURE VICRYL 2-0 CT-1

## (undated) DEVICE — FORESIGHT LARGE SENSOR: Brand: FORESIGHT

## (undated) DEVICE — ALARM PT PTCH LVL

## (undated) DEVICE — STABILIZER SRG UNV AST CABG

## (undated) DEVICE — SOL  .9 1000ML BTL

## (undated) DEVICE — 12 FOOT DISPOSABLE EXTENSION CABLE WITH SAFE CONNECT / SCREW-DOWN

## (undated) NOTE — LETTER
[facility] [address], [address]  [address]  INFORMED CONSENT FOR TRANSFUSION OF BLOOD OR BLOOD PRODUCTS  My physician has informed me of the nature, purpose, benefits and risks of transfusion for blood and blood components that ______________________________________________  (Signature of Patient)                                                            (Responsible party in case of Minor,

## (undated) NOTE — MR AVS SNAPSHOT
[address] [address]  [phone]               Thank you for choosing us for your health care visit with Makenzie Ellis MD.  We are glad to serve you and happy to provide you with this summary of your visit. (Approximate)    Assoc Dx:   Annual physical exam [Z00.00], Dizzy spells [R42], Exertional dyspnea [R06.09], Essential hypertension [I10], Chronic bilateral low back pain with left-sided sciatica [M54.42, G89.29], History of smoking 10-25 pack years [Z87.89 pain with left-sided sciatica [M54.42, G89.29], History of smoking 10-25 pack years [Z87.891], Palpitations [R00.2]           CARD NUC STRESS TEST LEXISCAN    Complete by:  Feb 14, 2017 (Approximate)    Assoc Dx:  Dizzy spells [R42], Exertional dyspnea [R0 your Zip Code and Date of Birth to complete the sign-up process. If you do not sign up before the expiration date, you must request a new code. Your unique [employer] Access Code:  [email or web address]  Expires: 4/15/2017 10:09 AM    If you have questions, you can c

## (undated) NOTE — Clinical Note
SIM, TCM call made, see notes. Patient reports that he has a HFU appointment scheduled on 5/4/21. This is outside of the TCM timeframe. TCM HFU recommended by 4/20/21 preferred, no later than 4/27/2021. Message sent to MD's office.

## (undated) NOTE — ED AVS SNAPSHOT
Mena Fields   MRN: [id number]    Department:  [facility] Emergency Department   Date of Visit:  8/28/2019           Disclosure     Insurance plans vary and the physician(s) referred by the ER may not be covered by your plan.  Please contact yo CARE PHYSICIAN AT ONCE OR RETURN IMMEDIATELY TO THE EMERGENCY DEPARTMENT. If you have been prescribed any medication(s), please fill your prescription right away and begin taking the medication(s) as directed.   If you believe that any of the medications

## (undated) NOTE — LETTER
[facility]  Administration of Anesthesia  1. Mariposa Conde, or _________________________________ acting on his behalf, (Patient) (Dependent/Representative) request to receive anesthesia for my pending procedure/operation/treatment.   SENTHIL p bleeding, seizure, cardiac arrest and death. 7. AWARENESS: I understand that it is possible (but unlikely) to have explicit memory of events from the operating room while under general anesthesia.   8. ELECTROCONVULSIVE THERAPY PATIENTS: This consent serve below affirms that prior to the time of the procedure, I have explained to the patient and/or his/her guardian, the risks and benefits of undergoing anesthesia, as well as any reasonable alternatives.     ___________________________________________________

## (undated) NOTE — ED AVS SNAPSHOT
Sarita Linn   MRN: [id number]    Department:  [facility] Emergency Department   Date of Visit:  2/2/2019           Disclosure     Insurance plans vary and the physician(s) referred by the ER may not be covered by your plan.  Please contact you CARE PHYSICIAN AT ONCE OR RETURN IMMEDIATELY TO THE EMERGENCY DEPARTMENT. If you have been prescribed any medication(s), please fill your prescription right away and begin taking the medication(s) as directed.   If you believe that any of the medications

## (undated) NOTE — LETTER
[address] [place] [address]      Authorization for Surgical Operation and Procedure     Date:___________                                                                                                         Time:_______ all, of the potential risks that can occur: fever and allergic reactions, hemolytic reactions, transmission of diseases such as Hepatitis, AIDS and Cytomegalovirus (CMV) and fluid overload.   In the event that I wish to have an autologous transfusion of my physician will determine when the applicable recovery period ends for purposes of reinstating the DNAR order.   10. Patients having a sterilization procedure: I understand that if the procedure is successful the results will be permanent and it will therefo _______________________________________________________________ _____________________________  Tacos Dia                                                                                         (Date)                                   (Time)

## (undated) NOTE — MR AVS SNAPSHOT
[address] [facility] [address]  [phone]               Thank you for choosing us for your health care visit with Salomon Tyson MD.  We are glad to serve you and happy to provide you with this summary of your Take 1 capsule (5 mg total) by mouth nightly.    Commonly known as:  HYTRIN                Where to Get Your Medications      These medications were sent to [facility], [address]

## (undated) NOTE — MR AVS SNAPSHOT
[address] [address]  [phone]               Thank you for choosing us for your health care visit with Jaron Covarrubias MD.  We are glad to serve you and happy to provide you with this summary of your [address] [place], [place]    It is the patient's responsibility to check with and follow their insurance company's guidelines for prior authorization for this test.  You may be held responsible for payment in full if proper authorization is not acquired.   Please contac Diagnoses and all orders for this visit:    Essential hypertension  BP overall doing well   High cholesterol  Patient with mildly high cholesterol, recommend diet at least 6 months before rechec  History of smoking 10-25 pack years  Needs to stop smoking your Zip Code and Date of Birth to complete the sign-up process. If you do not sign up before the expiration date, you must request a new code.     Your unique [employer] Access Code: [email or web address]  Expires: 7/19/2017  9:39 AM    If you have questions, you can c

## (undated) NOTE — LETTER
Shirley Nuñez Md  [address] [place], [place] [address]       03/07/23        Patient: Alfonzo Vanegas   YOB: 1965   Date of Visit: 3/7/2023       Dear  Dr. Sofiya De La Fuente MD,      Thank you for referring Alfonzo Vanegas to my practice. Please find my assessment and plan below. ASSESSMENT AND PLAN    1. Obstructive sleep apnea  We did review his sleep study which did reveal fairly severe obstructive sleep apnea. He did specifically discuss his desire to perhaps have an implantable device such as Inspire and we specifically discussed the fact that certain criteria need to be met before he would be a candidate for this procedure. The most important would be that he trial CPAP therefore I did ask him to undergo a CPAP titration which is already ordered for him and he is going to simply go ahead and schedule it. Once he does a CPAP titration he will contact our office and we will go ahead and order machine for him which she will use for 2 months and then he will return to see me after those 2 months to discuss further management options. He agrees with this plan               Sincerely,   Olaf Kapoor MD   [address], [place]  [address] [facility]  [address] [address]    Document electronically generated by:  Dayna Kapoor MD